# Patient Record
Sex: FEMALE | Race: BLACK OR AFRICAN AMERICAN | Employment: OTHER | URBAN - METROPOLITAN AREA
[De-identification: names, ages, dates, MRNs, and addresses within clinical notes are randomized per-mention and may not be internally consistent; named-entity substitution may affect disease eponyms.]

---

## 2017-06-23 ENCOUNTER — TELEPHONE (OUTPATIENT)
Dept: RHEUMATOLOGY | Age: 67
End: 2017-06-23

## 2017-06-28 ENCOUNTER — OFFICE VISIT (OUTPATIENT)
Dept: RHEUMATOLOGY | Age: 67
End: 2017-06-28

## 2017-06-28 VITALS
TEMPERATURE: 97.6 F | SYSTOLIC BLOOD PRESSURE: 121 MMHG | RESPIRATION RATE: 18 BRPM | DIASTOLIC BLOOD PRESSURE: 76 MMHG | HEIGHT: 63 IN | BODY MASS INDEX: 29.06 KG/M2 | HEART RATE: 64 BPM | OXYGEN SATURATION: 100 % | WEIGHT: 164 LBS

## 2017-06-28 DIAGNOSIS — Z79.60 LONG-TERM USE OF IMMUNOSUPPRESSANT MEDICATION: ICD-10-CM

## 2017-06-28 DIAGNOSIS — Z79.899 LONG-TERM USE OF PLAQUENIL: ICD-10-CM

## 2017-06-28 DIAGNOSIS — M05.79 SEROPOSITIVE RHEUMATOID ARTHRITIS OF MULTIPLE SITES (HCC): Primary | ICD-10-CM

## 2017-06-28 DIAGNOSIS — M19.042 PRIMARY OSTEOARTHRITIS OF BOTH HANDS: ICD-10-CM

## 2017-06-28 DIAGNOSIS — M19.041 PRIMARY OSTEOARTHRITIS OF BOTH HANDS: ICD-10-CM

## 2017-06-28 DIAGNOSIS — M35.00 SECONDARY SJOGREN'S SYNDROME (HCC): ICD-10-CM

## 2017-06-28 DIAGNOSIS — B37.0 ORAL THRUSH: ICD-10-CM

## 2017-06-28 DIAGNOSIS — M17.0 PRIMARY OSTEOARTHRITIS OF BOTH KNEES: ICD-10-CM

## 2017-06-28 RX ORDER — METHOTREXATE 2.5 MG/1
20 TABLET ORAL
COMMUNITY
End: 2018-01-10

## 2017-06-28 RX ORDER — BISMUTH SUBSALICYLATE 262 MG
1 TABLET,CHEWABLE ORAL DAILY
COMMUNITY
End: 2018-07-09

## 2017-06-28 RX ORDER — FLUCONAZOLE 150 MG/1
150 TABLET ORAL DAILY
Qty: 1 TAB | Refills: 0 | Status: SHIPPED | OUTPATIENT
Start: 2017-06-28 | End: 2017-06-29

## 2017-06-28 RX ORDER — HYDROXYCHLOROQUINE SULFATE 200 MG/1
200 TABLET, FILM COATED ORAL 2 TIMES DAILY
COMMUNITY
End: 2017-06-28 | Stop reason: ALTCHOICE

## 2017-06-28 RX ORDER — MELOXICAM 15 MG/1
15 TABLET ORAL DAILY
COMMUNITY
End: 2017-09-28

## 2017-06-28 RX ORDER — FOLIC ACID 1 MG/1
1 TABLET ORAL DAILY
Qty: 90 TAB | Refills: 0 | Status: SHIPPED | OUTPATIENT
Start: 2017-06-28 | End: 2017-09-26

## 2017-06-28 RX ORDER — FOLIC ACID 1 MG/1
TABLET ORAL DAILY
COMMUNITY
End: 2017-06-28 | Stop reason: SDUPTHER

## 2017-06-28 NOTE — PATIENT INSTRUCTIONS
COMPLETE AND STOP RHEUMATE    TAKE FOLIC ACID DAILY    STOP HYDROXYCHLOROQUINE    SJOGREN'S SYNDROME    Sjogren's Syndrome is an autoimmune disease that generally affects the salivary and lacrimal glands. Manifestations generally include xerostomia and xerophthalmia, also known as keratoconjunctivitis sicca, that is not secondary to medications (psychiatric, anti-hypertensive), metabolic disorders (hypothyroidism, diabetes), contact lens wearing, and/or decreased blinking (computer work, reading). It may also involve extraglandular organs. There is up to a 10% risk for development of lymphoma, which is up to 44 times higher than the normal population. The greatest risk factor for lymphoma include persistently enlarged salivary glands. Other risk factors include cutaneous vasculitis, lymphadenopathy, splenomegaly, cryoglobulinemia, and the development of glomerulonephritis. Laboratory abnormalities associated with the increased risk for lymphoma include mixed monoclonal cryoglobulinemia, low serum complement C3 or C4, an IgM kappa monoclonal protein, and neutropenia. Therefore, the need for monitoring these labs serially is necessary. My Recommendations    I recommend at least annual ophthalmic.  I referred you to ophthalmology to test you for dry eyes [facility])     I recommend at least annual dental evaluations.    - Brushing teeth at least twice per day   - Avoid sugary lozenges, beverages (Coke/Pepsi) candies and/or gums and to avoid using alcohol based mouth wash   - Sugar-free products are preferabe   - Biotin oral products are preferable

## 2017-06-28 NOTE — MR AVS SNAPSHOT
Visit Information Date & Time Provider Department Dept. Phone Encounter #  
 6/28/2017 11:00 AM Janet Cabral MD [facility] and [address] [place] [address] [phone] Follow-up Instructions Return in about 3 months (around 9/28/2017). Upcoming Health Maintenance Date Due Hepatitis C Screening 1950 DTaP/Tdap/Td series (1 - Tdap) 10/22/1970 BREAST CANCER SCRN MAMMOGRAM 10/22/1999 FOBT Q 1 YEAR AGE 50-75 10/22/1999 ZOSTER VACCINE AGE 60> 10/22/2009 GLAUCOMA SCREENING Q2Y 10/22/2014 OSTEOPOROSIS SCREENING (DEXA) 10/22/2014 Pneumococcal 65+ Low/Medium Risk (1 of 2 - PCV13) 10/22/2014 MEDICARE YEARLY EXAM 10/22/2014 INFLUENZA AGE 9 TO ADULT 8/1/2017 Allergies as of 6/28/2017  Review Complete On: 6/28/2017 By: Janet Cabral MD  
  
 Severity Noted Reaction Type Reactions Nickel  06/28/2017    Rash Current Immunizations  Never Reviewed No immunizations on file. Not reviewed this visit You Were Diagnosed With   
  
 Codes Comments Seropositive rheumatoid arthritis of multiple sites [facility])    -  Primary ICD-10-CM: M05.79 ICD-9-CM: 714.0 Long-term use of immunosuppressant medication     ICD-10-CM: Z79.899 ICD-9-CM: V58.69 Primary osteoarthritis of both knees     ICD-10-CM: M17.0 ICD-9-CM: 715.16 Primary osteoarthritis of both hands     ICD-10-CM: M19.041, D95.528 ICD-9-CM: 715.14 Secondary Sjogren's syndrome ([facility] [address]     ICD-10-CM: M35.00 ICD-9-CM: 710.2 Oral thrush     ICD-10-CM: B37.0 ICD-9-CM: 112.0 Vitals BP Pulse Temp Resp Height(growth percentile) Weight(growth percentile) 121/76 (BP 1 Location: Right arm, BP Patient Position: Sitting) 64 97.6 °F (36.4 °C) 18 5' 3\" (1.6 m) 164 lb (74.4 kg) SpO2 BMI OB Status Smoking Status 100% 29.05 kg/m2 Postmenopausal Former Smoker BMI and BSA Data Body Mass Index Body Surface Area  29.05 kg/m 2 1.82 m 2  
  
  
 Preferred Pharmacy Pharmacy Name Phone [facility], [address] [place] [address] [phone] Your Updated Medication List  
  
   
This list is accurate as of: 6/28/17 11:07 AM.  Always use your most recent med list.  
  
  
  
  
 fluconazole 150 mg tablet Commonly known as:  DIFLUCAN Take 1 Tab by mouth daily for 1 day. FDA advises cautious prescribing of oral fluconazole in pregnancy. folic acid 1 mg tablet Commonly known as:  Google Take 1 Tab by mouth daily for 90 days. meloxicam 15 mg tablet Commonly known as:  MOBIC Take 15 mg by mouth daily. methotrexate 2.5 mg tablet Commonly known as:  Ora Fairchild Take 20 mg by mouth every Tuesday. multivitamin tablet Commonly known as:  ONE A DAY Take 1 Tab by mouth daily. Prescriptions Sent to Pharmacy Refills  
 fluconazole (DIFLUCAN) 150 mg tablet 0 Sig: Take 1 Tab by mouth daily for 1 day. FDA advises cautious prescribing of oral fluconazole in pregnancy. Class: Normal  
 Pharmacy: [id number] [place], [place] [address] Ph #: [phone] Route: Oral  
 folic acid (FOLVITE) 1 mg tablet 0 Sig: Take 1 Tab by mouth daily for 90 days. Class: Normal  
 Pharmacy: [id number] [place] [address] Ph #: [phone] Route: Oral  
  
We Performed the Following ANTINUCLEAR ANTIBODIES, IFA [id number] CPT(R)] C REACTIVE PROTEIN, QT [26557 CPT(R)]   
 C3+C4+COMPT [[id number] Custom] CBC WITH AUTOMATED DIFF [93936 CPT(R)] CHRONIC HEPATITIS PANEL [[id number] Custom] CRYOGLOBULIN W/ REFLX CHASE [[id number] Custom] [address], IGG [id number] CPT(R)] METABOLIC PANEL, COMPREHENSIVE [36464 CPT(R)] PROT+CREATU (RANDOM) [id number] CPT(R)] PROTEIN ELECTROPHORESIS W/ REFLX CHASE [[id number] Custom] QUANTIFERON TB GOLD [[id number] Custom] REFERRAL TO OPHTHALMOLOGY [REF57 Custom] Comments:  
 Dr. Idalia Goodell for dry eye exam - [phone] RHEUMATOID FACTOR, QL [id number] CPT(R)] SED RATE (ESR) [id number] CPT(R)] SJOGREN'S ABS, SSA AND SSB [[id number] Custom] UA/M W/RFLX CULTURE, ROUTINE [[id number] Custom] URIC ACID [id number] CPT(R)] VITAMIN D, 25 HYDROXY [id number] CPT(R)] Follow-up Instructions Return in about 3 months (around 9/28/2017). To-Do List   
 06/28/2017 Imaging:  XR FOOT LT MIN 3 V   
  
 06/28/2017 Imaging:  XR FOOT RT MIN 3 V   
  
 06/28/2017 Imaging:  XR HAND LT MIN 3 V   
  
 06/28/2017 Imaging:  XR HAND RT MIN 3 V Referral Information Referral ID Referred By Referred To  
  
 [id number] sorin [address] Visits Status Start Date End Date 1 New Request 6/28/17 6/28/18 If your referral has a status of pending review or denied, additional information will be sent to support the outcome of this decision. Patient Instructions COMPLETE AND STOP RHEUMATE 
 
TAKE FOLIC ACID DAILY 
 
STOP HYDROXYCHLOROQUINE 
 
SJOGREN'S SYNDROME Sjogren's Syndrome is an autoimmune disease that generally affects the salivary and lacrimal glands. Manifestations generally include xerostomia and xerophthalmia, also known as keratoconjunctivitis sicca, that is not secondary to medications (psychiatric, anti-hypertensive), metabolic disorders (hypothyroidism, diabetes), contact lens wearing, and/or decreased blinking (computer work, reading). It may also involve extraglandular organs. There is up to a 10% risk for development of lymphoma, which is up to 44 times higher than the normal population. The greatest risk factor for lymphoma include persistently enlarged salivary glands.  Other risk factors include cutaneous vasculitis, lymphadenopathy, splenomegaly, cryoglobulinemia, and the development of glomerulonephritis. Laboratory abnormalities associated with the increased risk for lymphoma include mixed monoclonal cryoglobulinemia, low serum complement C3 or C4, an IgM kappa monoclonal protein, and neutropenia. Therefore, the need for monitoring these labs serially is necessary. My Recommendations I recommend at least annual ophthalmic. I referred you to ophthalmology to test you for dry eyes [facility]) I recommend at least annual dental evaluations.  
 - Brushing teeth at least twice per day - Avoid sugary lozenges, beverages (Coke/Pepsi) candies and/or gums and to avoid using alcohol based mouth wash - Sugar-free products are preferabe - Biotin oral products are preferable Introducing [facility]! [employer] introduces Unype patient portal. Now you can access parts of your medical record, email your doctor's office, and request medication refills online. 1. In your internet browser, go to [email or web address] "[email or web address] 2. Click on the First Time User? Click Here link in the Sign In box. You will see the New Member Sign Up page. 3. Enter your Unype Access Code exactly as it appears below. You will not need to use this code after youve completed the sign-up process. If you do not sign up before the expiration date, you must request a new code. · Unype Access Code: [email or web address] Expires: 9/26/2017 11:04 AM 
 
4. Enter the last four digits of your Social Security Number (xxxx) and Date of Birth (mm/dd/yyyy) as indicated and click Submit. You will be taken to the next sign-up page. 5. Create a iBoxPayt ID. This will be your iBoxPayt login ID and cannot be changed, so think of one that is secure and easy to remember. 6. Create a Unype password. You can change your password at any time. 7. Enter your Password Reset Question and Answer.  This can be used at a later time if you forget your password. 8. Enter your e-mail address. You will receive e-mail notification when new information is available in [address] 9. Click Sign Up. You can now view and download portions of your medical record. 10. Click the Download Summary menu link to download a portable copy of your medical information. If you have questions, please visit the Frequently Asked Questions section of the obopay website. Remember, obopay is NOT to be used for urgent needs. For medical emergencies, dial 911. Now available from your iPhone and Android! Please provide this summary of care documentation to your next provider. If you have any questions after today's visit, please call [phone].

## 2017-06-28 NOTE — PROGRESS NOTES
REASON FOR VISIT    This is the initial evaluation for Ms. Pennie Nazario a 78 y.o.  female for question of an inflammatory arthritis. The patient is self-referred to the Arthritis and [address] [place] [address]. HISTORY OF PRESENT ILLNESS      I have reviewed and summarized old records from [facility] and Dr. Arvind Lopez office notes. In 12/2014, she developed pain that started in her feet, ankles and then spread to her hands wrists, knees, shoulders. There was swelling in is ankles and hands. In 2/015, she was evaluated by Dr. Chris Canales who diagnosed her with Seropositive Rheumatoid Arthritis and started her on prednisone, hydroxychloroquine and methotrexate 20 mg weekly. She was also diagnosed with osteoarthritis and found to have positive anti-Scl-70. In 12/12/2016, labs showed WBC 4.8, lymphocytes 1.8, Hct 38.3%, platelets 127,964, creatinine 0.69 mg/dL, eGFR >60, albumin 4.2 g/dL, ALK 68 U/L, ALT 24 U/L, AST 21 U/L, ESR 9 mm/hr, CRP 0.5 mg/L. In 11/2016, she developed oral ulcers, so started taking folic acid as needed which has helped. She has been taking Rheumate daily, which contains folic acid. In 12/2016, she also developed dry mouth. In 1/2017, she saw optometrist.     In 2/2017, she had dental implants and her dry mouth has worsened. She was found to have oral thrush. She was prescribed Nystatin for two weeks. She remembers being prescribed with an antibiotic. She is a former smoker. She has a family history of Rheumatoid Arthritis in her grandmother. Today, she complains of morning stiffness in her fingers lasting 30 minutes. She denies pain or swelling in her joints. She feels that methotrexate is causing dry mouth and a sore tongue. If it rains, she feels aching in her wrists. She has not had her eyes tested for hydroxychloroquine. She denies xerophthalmia. She does not need water to swallow food. She is a retired teacher and hair.      Therapy History includes:    Current DMARD therapy includes: hydroxychloroquine 400 daily, methotrexate 20 mg every Tuesday  Prior DMARD therapy includes: none  The following DMARDs have been ineffective: none  The following DMARDs were stopped because of side effects: none    REVIEW OF SYSTEMS    A 15 point review of systems was performed and summarized below. The questionnaire was reviewed with the patient and scanned into the patient's medical record.     General: denies recent weight gain, recent weight loss, fatigue, weakness, fever, night sweats  Musculoskeletal: endorses morning stiffness (lasting 30 minutes), denies joint pain, joint swelling, muscle weakness  Ears: denies ringing in ears, loss of hearing, deafness  Eyes: denies pain, redness, loss of vision, double vision, blurred vision, dryness, foreign body sensation  Mouth: endorses sore tongue, oral ulcers, denies bleeding gums, loss of taste, dryness, increased dental caries  Nose: denies nosebleeds, loss of smell, nasal ulcers  Throat: denies frequent sore throats, hoarseness, difficulty in swallowing, pain in jaw while chewing  Neck: denies swollen glands, tender glands  Cardiopulmonary: endorses swollen legs or feet, denies pain in chest, irregular heart beat, sudden changes in heart beat, shortness of breath, difficulty breathing at night, cough, coughing of blood, wheezing  Gastrointestinal: denies nausea, heartburn, stomach pain relieved by food, vomiting of blood/\"coffee grounds\", jaundice, increasing constipation, persistent diarrhea, blood in stools, black stools  Genitourinary: endorses getting up at night to pass urine, frequent urination, vaginal dryness, denies difficult urination, pain or burning on urination, blood in urine, cloudy urine, pus in urine, genital discharge, rash/ulcers, sexual difficulties  Hematologic: denies anemia, bleeding tendency, blood clots  Skin: denies easy bruising, redness, rash, hives, sun sensitive, skin tightness, nodules/bumps, hair loss, color changes of hands or feet in the cold (Raynaud's)  Neurologic: denies headaches, dizziness, fainting of loss of consciousness, numbness or tingling in hands/feet, memory loss, muscle weakness  Psychiatric: denies depression, excessive worries  Sleep: denies poor sleep (7 hours), snoring, daytime somnolence, difficulty falling asleep, difficulty staying asleep     PAST MEDICAL HISTORY    She has a past medical history of Rheumatoid arthritis ([facility] [address] FAMILY HISTORY    Her family history includes Arthritis-rheumatoid in her paternal grandmother; Diabetes in her mother; Heart Attack in her father; Parkinson's Disease in her maternal grandmother. SOCIAL HISTORY    She reports that she has quit smoking. She has never used smokeless tobacco. She reports that she drinks alcohol. She reports that she does not use illicit drugs. GYNECOLOGIC HISTORY     3, Para 3, Living 3, Miscarriage 0    She denies severe pre-eclampsia, eclampsia or placental insufficiency    HEALTH MAINTENANCE    Immunizations    There is no immunization history on file for this patient. Age Appropriate Cancer Screening    Mammogram:     MEDICATIONS    Current Outpatient Prescriptions   Medication Sig Dispense Refill    methotrexate (RHEUMATREX) 2.5 mg tablet Take 20 mg by mouth every Tuesday.  meloxicam (MOBIC) 15 mg tablet Take 15 mg by mouth daily.  multivitamin (ONE A DAY) tablet Take 1 Tab by mouth daily.  fluconazole (DIFLUCAN) 150 mg tablet Take 1 Tab by mouth daily for 1 day. FDA advises cautious prescribing of oral fluconazole in pregnancy. 1 Tab 0    folic acid (FOLVITE) 1 mg tablet Take 1 Tab by mouth daily for 90 days.  90 Tab 0     ALLERGIES    Allergies   Allergen Reactions    Nickel Rash       PHYSICAL EXAMINATION    Visit Vitals    /76 (BP 1 Location: Right arm, BP Patient Position: Sitting)    Pulse 64    Temp 97.6 °F (36.4 °C)    Resp 18    Ht 5' 3\" (1.6 m)  Wt 164 lb (74.4 kg)    SpO2 100%    BMI 29.05 kg/m2     Body mass index is 29.05 kg/(m^2). General: Patient is alert, oriented x 3, not in acute distress    HEENT:   Conjunctiva are not injected and appear moist, oral mucous membranes are moist, there are no ulcers present, there is no alopecia, neck is supple, there is no lymphadenopathy. Left lacrimal gland enlargement. Sight enlargement of parotids. Moist mucous with good salivary pooling. Oral Thrush    Cardiovascular:  Heart is regular rate and rhythm, no murmurs. Chest:  Lungs are clear to auscultation bilaterally. Abdomen:  Soft, non-tender    Extremities:  Free of clubbing, cyanosis, edema, extremities well perfused. Neurological exam:  No focal sensory deficits, muscle strength is full in upper and lower extremities. Skin exam:  There are no rashes, no tophi, no psoriasis, no active Raynaud's, no livedo reticularis, no periungual erythema. Musculoskeletal exam:  A comprehensive musculoskeletal exam was performed for all joints of each upper and lower extremity and assessed for swelling, tenderness and range of motion. Pertinent results are documented as below:    Bilateral Gabe and Heberden nodes. Bilateral knee crepitus without effusion. Joint Count 6/28/2017   Patient pain (0-100) 0   MHAQ 0   Right 3rd PIP - Swollen 1   Swollen Joint Count (Total) 1   Patient Assessment (0-10) 0            DATA REVIEW    Prior medical records were reviewed and are summarized as below:    Laboratory data: summarized in the HPI    Imaging: none     ASSESSMENT AND PLAN    1) Seropositive Rheumatoid Arthritis. She was diagnosed with seropositive Rheumatoid Arthritis by Dr. Arsenio Rea and has been maintained on hydroxychloroquine 400 mg daily and methotrexate 20 mg every Tuesday. She is doing well except for 30 minutes of stiffness in her hands, which appear to be osteoarthritis. Her CDAI was 1 with 0 tender and 1 swollen joint. She has a chronically swollen 3rd right PIP which she reports is from an injury years ago. She wants to wean off methotrexate. I discontinued hydroxychloroquine and informed her that I will reassess her in 3 months and if she has no interval flares or active disease, I will start weaning her off. She has been taking Rheumate instead of folic acid 1 mg daily and has had interval oral ulcers, so I asked her to stop Rheumate and start taking folic acid 1 mg daily. I sent her a refill. I ordered labs and radiographs today. 2) Long Term Use of Immunosuppressants. The patient remains on immunomodulatory medications (methotrexate) and requires frequent toxicity monitoring by blood work. Respective labs were ordered (CBC and CMP). 3) Sjogren's Syndrome. (lacrimal and parotid gland hypertrophy, xerostomia). Sjogren's Syndrome is an autoimmune disease that generally affects the salivary and lacrimal glands. Manifestations generally include xerostomia and xerophthalmia, also known as keratoconjunctivitis sicca, that is not secondary to medications (psychiatric, anti-hypertensive), metabolic disorders (hypothyroidism, diabetes), contact lens wearing, and/or decreased blinking (computer work, reading). It may also involve extraglandular organs. There is up to a 10% risk for development of lymphoma, which is up to 44 times higher than the normal population. The greatest risk factor for lymphoma include persistently enlarged salivary glands. Other risk factors include cutaneous vasculitis, lymphadenopathy, splenomegaly, cryoglobulinemia, and the development of glomerulonephritis. Laboratory abnormalities associated with the increased risk for lymphoma include mixed monoclonal cryoglobulinemia, low serum complement C3 or C4, an IgM kappa monoclonal protein, and neutropenia. Therefore, the need for monitoring these labs serially is necessary. I recommend at least annual ophthalmic and dental evaluations.  Brushing teeth twice per day was suggested. I counseled the patient to avoid sugary lozenges, candies and/or gums and to avoid using alcohol based mouth wash. Sugar-free, fruits, and Biotin oral products are preferable. She has gland enl;argement but no lymphadenopathy, night sweats, or unintentional weight loss. I will check labs and I referred her to ophthalmology, Dr. Shayan Newell. 4) Bilateral Knee Osteoarthritis. The patient has osteoarthritis, which is also known as \"wear and tear arthritis,\" non-inflammatory arthritis or mechanical arthritis. There are hereditary, vocational and posttraumatic joint injuries predisposing factors. I recommend maintaining a healthy weight to slow the progression of osteoarthritis in addition to following the [employer] of Rheumatology Osteoarthritis Treatment Guidelines: (1) non-pharmacologic modalities such as aerobic, aquatic, and/or resistance exercises as well as weight loss for overweight patients; in addition to (2) pharmacologic modalities such as acetaminophen as first line, oral and topical NSAIDs as second line, tramadol as third line and intra-articular corticosteroid injections as fourth line (Hal MC, et al. Arthritis Care Res [facility] ORTHOPEDIC). 2012;64(4):465). Naproxen is a low SANDERS-2 selectivity inhibitor that poses lower cardiovascular risk than other NSAIDs (Angiolillo DJ, Joaquín SM. Clinical Pharmacology and Cardiovascular Safety of Naproxen. Am J Cardiovasc Drugs. 2016 Nov 8). NSAIDs should not be used in patients on blood thinners, chronic kidney disease, high risk coronary artery disease, and inflammatory bowel disease (ulcerative colitis or Crohn's disease) Joint replacement surgery if all previous fail, knowing that there is a 10 year lifespan per prosthesis. I recommend weight loss because every 1 lb of extra weight is approximately 5 lbs of extra weight on the knees. I asked the patient to count their daily caloric intake and try not to exceed 1378-2398 calories.  I asked the patient to perform at least 30 minutes of physical exercise per day, such as walking, climbing stairs, or swimming. If they have access to a pool, I recommend walking in the pool for at least 30 minutes every day. Performing at least 8 weeks of yoga (two 60-minute classes and 1 home practice per week) was shown to help individuals with arthritis safely increase physical activity, and improve physical and psychological health [facility] et al. Read Tor Rheumatol. 2015 Jul;42(7):1194-202). 5) Bilateral Hand Osteoarthritis. The patient has osteoarthritis, which is also known as \"wear and tear arthritis,\" non-inflammatory arthritis or mechanical arthritis. There are hereditary, vocational and posttraumatic joint injuries predisposing factors. I recommend non-pharmacologic modalities such as keeping hands warm, avoid activities that case pain, warm water soaks, in addition to (2) pharmacologic modalities such as acetaminophen as first line, oral and topical NSAIDs as second line. Naproxen is a low SANDERS-2 selectivity inhibitor that poses lower cardiovascular risk than other NSAIDs (Angioliayden DJ, Joaquín SM. Clinical Pharmacology and Cardiovascular Safety of Naproxen. Am J Cardiovasc Drugs. 2016 Nov 8). NSAIDs should not be used in patients on blood thinners, chronic kidney disease, high risk coronary artery disease, and inflammatory bowel disease (ulcerative colitis or Crohn's disease)    6) Oral Thrush. She did not have complete response to Nystatin. I prescribed her fluconazole. 7) Long Term Use of Hydroxychloroquine (Plaquenil). I will discontinue it. The patient voiced understanding of the aforementioned assessment and plan. Summary of plan was provided in the After Visit Summary patient instructions. I also provided education about MyChart setup and utility.     TODAY'S ORDERS    Orders Placed This Encounter    QUANTIFERON TB GOLD    XR FOOT LT MIN 3 V    XR FOOT RT MIN 3 V    XR HAND LT MIN 3 V    XR HAND RT MIN 3 V    CYCLIC CITRUL PEPTIDE AB, IGG    CBC WITH AUTOMATED DIFF    CHRONIC HEPATITIS PANEL    METABOLIC PANEL, COMPREHENSIVE    C REACTIVE PROTEIN, QT    SED RATE (ESR)    RHEUMATOID FACTOR, QL    PROTEIN ELECTROPHORESIS W/ REFLX CHASE    UA/M W/RFLX CULTURE, ROUTINE    URIC ACID    PROT+CREATU (RANDOM)    VITAMIN D, 25 HYDROXY    ANTINUCLEAR ANTIBODIES, IFA    C3+C4+COMPT    CRYOGLOBULIN W/ REFLX CHASE    SJOGREN'S ABS, SSA AND SSB    REFERRAL TO OPHTHALMOLOGY    methotrexate (RHEUMATREX) 2.5 mg tablet    DISCONTD: hydroxychloroquine (PLAQUENIL) 200 mg tablet    fluconazole (DIFLUCAN) 150 mg tablet    folic acid (FOLVITE) 1 mg tablet     Future Appointments  Date Time Provider Alyssa Ha   9/28/2017 10:00 AM MD Aba Fox MD, [facility]    Adult Rheumatology   Musculoskeletal Ultrasound Certified  [address] [place] [address] Ave   [address] [address]   Phone [phone]  Fax [phone]

## 2017-06-29 LAB
C3 SERPL-MCNC: 140 MG/DL (ref 82–167)
C4 SERPL-MCNC: 30 MG/DL (ref 14–44)
CH50 SERPL-ACNC: >60 U/ML (ref 42–60)
COMMENT, 144067: NORMAL
HBV CORE AB SERPL QL IA: NEGATIVE
HBV CORE IGM SERPL QL IA: NEGATIVE
HBV E AB SERPL QL IA: NEGATIVE
HBV E AG SERPL QL IA: NEGATIVE
HBV SURFACE AB SER QL: NON REACTIVE
HBV SURFACE AG SERPL QL IA: NEGATIVE
HCV AB S/CO SERPL IA: <0.1 S/CO RATIO (ref 0–0.9)

## 2017-07-01 LAB
APPEARANCE UR: ABNORMAL
BACTERIA #/AREA URNS HPF: ABNORMAL /[HPF]
BACTERIA UR CULT: NORMAL
BILIRUB UR QL STRIP: NEGATIVE
CASTS URNS QL MICRO: ABNORMAL /LPF
COLOR UR: YELLOW
CREAT UR-MCNC: 38.4 MG/DL
EPI CELLS #/AREA URNS HPF: >10 /HPF
GLUCOSE UR QL: NEGATIVE
HGB UR QL STRIP: NEGATIVE
KETONES UR QL STRIP: NEGATIVE
LEUKOCYTE ESTERASE UR QL STRIP: ABNORMAL
MICRO URNS: ABNORMAL
NITRITE UR QL STRIP: NEGATIVE
PH UR STRIP: 6 [PH] (ref 5–7.5)
PROT UR QL STRIP: NEGATIVE
PROT UR-MCNC: 7.6 MG/DL
PROT/CREAT UR: 198 MG/G CREAT (ref 0–200)
RBC #/AREA URNS HPF: ABNORMAL /HPF
SP GR UR: 1.01 (ref 1–1.03)
URINALYSIS REFLEX, 377202: ABNORMAL
UROBILINOGEN UR STRIP-MCNC: 0.2 MG/DL (ref 0.2–1)
WBC #/AREA URNS HPF: ABNORMAL /HPF

## 2017-07-03 LAB
25(OH)D3+25(OH)D2 SERPL-MCNC: 29.7 NG/ML (ref 30–100)
ALBUMIN SERPL ELPH-MCNC: 4 G/DL (ref 2.9–4.4)
ALBUMIN SERPL-MCNC: 4.6 G/DL (ref 3.6–4.8)
ALBUMIN/GLOB SERPL: 1.6 {RATIO} (ref 0.7–1.7)
ALBUMIN/GLOB SERPL: 2.4 {RATIO} (ref 1.2–2.2)
ALP SERPL-CCNC: 81 IU/L (ref 39–117)
ALPHA1 GLOB SERPL ELPH-MCNC: 0.2 G/DL (ref 0–0.4)
ALPHA2 GLOB SERPL ELPH-MCNC: 0.7 G/DL (ref 0.4–1)
ALT SERPL-CCNC: 30 IU/L (ref 0–32)
ANA TITR SER IF: NEGATIVE {TITER}
AST SERPL-CCNC: 24 IU/L (ref 0–40)
B-GLOBULIN SERPL ELPH-MCNC: 1.2 G/DL (ref 0.7–1.3)
BASOPHILS # BLD AUTO: 0 X10E3/UL (ref 0–0.2)
BASOPHILS NFR BLD AUTO: 0 %
BILIRUB SERPL-MCNC: 0.3 MG/DL (ref 0–1.2)
BUN SERPL-MCNC: 14 MG/DL (ref 8–27)
BUN/CREAT SERPL: 17 (ref 12–28)
CALCIUM SERPL-MCNC: 9.8 MG/DL (ref 8.7–10.3)
CCP IGA+IGG SERPL IA-ACNC: >250 UNITS (ref 0–19)
CHLORIDE SERPL-SCNC: 103 MMOL/L (ref 96–106)
CO2 SERPL-SCNC: 24 MMOL/L (ref 18–29)
CREAT SERPL-MCNC: 0.83 MG/DL (ref 0.57–1)
CRP SERPL-MCNC: 0.7 MG/L (ref 0–4.9)
CRYOGLOB SER QL 1D COLD INC: NORMAL
ENA SS-A AB SER-ACNC: 0.2 AI (ref 0–0.9)
ENA SS-B AB SER-ACNC: <0.2 AI (ref 0–0.9)
EOSINOPHIL # BLD AUTO: 0.2 X10E3/UL (ref 0–0.4)
EOSINOPHIL NFR BLD AUTO: 5 %
ERYTHROCYTE [DISTWIDTH] IN BLOOD BY AUTOMATED COUNT: 15.3 % (ref 12.3–15.4)
ERYTHROCYTE [SEDIMENTATION RATE] IN BLOOD BY WESTERGREN METHOD: 2 MM/HR (ref 0–40)
GAMMA GLOB SERPL ELPH-MCNC: 0.5 G/DL (ref 0.4–1.8)
GLOBULIN SER CALC-MCNC: 1.9 G/DL (ref 1.5–4.5)
GLOBULIN SER CALC-MCNC: 2.5 G/DL (ref 2.2–3.9)
GLUCOSE SERPL-MCNC: 81 MG/DL (ref 65–99)
HCT VFR BLD AUTO: 41.6 % (ref 34–46.6)
HGB BLD-MCNC: 13.2 G/DL (ref 11.1–15.9)
IGA SERPL-MCNC: 131 MG/DL (ref 87–352)
IGG SERPL-MCNC: 485 MG/DL (ref 700–1600)
IGM SERPL-MCNC: 24 MG/DL (ref 26–217)
IMM GRANULOCYTES # BLD: 0 X10E3/UL (ref 0–0.1)
IMM GRANULOCYTES NFR BLD: 0 %
INTERPRETATION SERPL IEP-IMP: ABNORMAL
LYMPHOCYTES # BLD AUTO: 1.4 X10E3/UL (ref 0.7–3.1)
LYMPHOCYTES NFR BLD AUTO: 46 %
M PROTEIN SERPL ELPH-MCNC: 0.2 G/DL
MCH RBC QN AUTO: 28.6 PG (ref 26.6–33)
MCHC RBC AUTO-ENTMCNC: 31.7 G/DL (ref 31.5–35.7)
MCV RBC AUTO: 90 FL (ref 79–97)
MONOCYTES # BLD AUTO: 0.2 X10E3/UL (ref 0.1–0.9)
MONOCYTES NFR BLD AUTO: 7 %
NEUTROPHILS # BLD AUTO: 1.3 X10E3/UL (ref 1.4–7)
NEUTROPHILS NFR BLD AUTO: 42 %
PLATELET # BLD AUTO: 293 X10E3/UL (ref 150–379)
PLEASE NOTE, 011150: ABNORMAL
POTASSIUM SERPL-SCNC: 4.9 MMOL/L (ref 3.5–5.2)
PROT PATTERN SERPL ELPH-IMP: ABNORMAL
PROT SERPL-MCNC: 6.5 G/DL (ref 6–8.5)
RBC # BLD AUTO: 4.62 X10E6/UL (ref 3.77–5.28)
RHEUMATOID FACT SERPL-ACNC: 53.8 IU/ML (ref 0–13.9)
SODIUM SERPL-SCNC: 143 MMOL/L (ref 134–144)
URATE SERPL-MCNC: 5.8 MG/DL (ref 2.5–7.1)
WBC # BLD AUTO: 3 X10E3/UL (ref 3.4–10.8)

## 2017-07-04 LAB
ANNOTATION COMMENT IMP: NORMAL
GAMMA INTERFERON BACKGROUND BLD IA-ACNC: 0.05 IU/ML
M TB IFN-G BLD-IMP: NEGATIVE
M TB IFN-G CD4+ BCKGRND COR BLD-ACNC: <0 IU/ML
M TB IFN-G CD4+ T-CELLS BLD-ACNC: 0.04 IU/ML
MITOGEN IGNF BLD-ACNC: >10 IU/ML
QUANTIFERON INCUBATION: NORMAL
SERVICE CMNT-IMP: NORMAL

## 2017-07-04 NOTE — PROGRESS NOTES
The results were reviewed and a letter was sent. Slightly low WBC, low vitamin D, elevated rheumatoid factor 53.8, Monoclonal gammopathy of undetermined significance with IgG monoclonal protein with lambda light chain. All remaining labs are normal/negative. I will discuss on follow up.

## 2017-07-17 ENCOUNTER — TELEPHONE (OUTPATIENT)
Dept: RHEUMATOLOGY | Age: 67
End: 2017-07-17

## 2017-07-18 ENCOUNTER — TELEPHONE (OUTPATIENT)
Dept: RHEUMATOLOGY | Age: 67
End: 2017-07-18

## 2017-07-18 NOTE — TELEPHONE ENCOUNTER
We can send Zofran for the nausea. Does she want meloxicam? Does she need it?  I can refill if she does

## 2017-07-18 NOTE — TELEPHONE ENCOUNTER
Pt called asking for a refill of her meloxicam.  I asked her who prescribed it in the past and she said that Dr. Maya Adames prescribed it but that she no longer sees him. She was not sure if she was supposed to stay on this or not. Also, she wanted to let Dr. Christina Tinajero know that she has been nauseous on the methotrexate. She stated that she is ok to stay on it but that she just wanted Dr. Debora Luna to be aware as he asked to know what was going on with her medications and if she had any problems with them. She has stopped the plaquenil and the rheumate as directed.   Please advise

## 2017-07-19 ENCOUNTER — TELEPHONE (OUTPATIENT)
Dept: RHEUMATOLOGY | Age: 67
End: 2017-07-19

## 2017-07-20 NOTE — TELEPHONE ENCOUNTER
Spoke with pt and she stated that she thinks she is fine without the meloxicam, but if she finds that she needs it she will call back and we will refill it for her then. She does not want the zofran called in because she said the nausea was not that bad ans she really doesn't want to take it anyway.

## 2017-07-25 ENCOUNTER — TELEPHONE (OUTPATIENT)
Dept: RHEUMATOLOGY | Age: 67
End: 2017-07-25

## 2017-07-25 NOTE — TELEPHONE ENCOUNTER
Spoke with pt who stated that she is having a lot of thrush from the methotrexate. She stated that she has been dealing with it for a long time and is tired of it. I mentioned the nystatin swish and spit and she said she had been on it before but the thrush just came back. She would really like to come off of the methotrexate and be placed on something else to see if this problem will go away. Please advise.

## 2017-07-25 NOTE — TELEPHONE ENCOUNTER
I prescribed her fluconazole last visit. Did she use it? She has been on methotrexate for way before I saw her. We can try leflunomide instead.

## 2017-07-26 ENCOUNTER — TELEPHONE (OUTPATIENT)
Dept: RHEUMATOLOGY | Age: 67
End: 2017-07-26

## 2017-07-26 RX ORDER — LEFLUNOMIDE 20 MG/1
20 TABLET ORAL DAILY
Qty: 90 TAB | Refills: 0 | Status: SHIPPED | OUTPATIENT
Start: 2017-07-26 | End: 2018-01-10

## 2017-07-26 NOTE — TELEPHONE ENCOUNTER
Spoke with pt regarding the thrush that she is having from the methotrexate. I asked her if she had taken the diflucan when it was prescribed, and if it helped and she stated that she did take it, but that she did not have much relief from it. I mentioned to her about changing her medication from methotrexate to leflunomide and she was agreeable to that. I told her that I would send in a prescription for her. She stated an understanding.

## 2017-07-31 ENCOUNTER — TELEPHONE (OUTPATIENT)
Dept: RHEUMATOLOGY | Age: 67
End: 2017-07-31

## 2017-07-31 NOTE — TELEPHONE ENCOUNTER
Please call patient she is requesting  a prescription for mouth wash and treatment for thrush of the mouth.   [phone]

## 2017-08-01 ENCOUNTER — TELEPHONE (OUTPATIENT)
Dept: RHEUMATOLOGY | Age: 67
End: 2017-08-01

## 2017-08-01 RX ORDER — NYSTATIN 100000 [USP'U]/ML
1 SUSPENSION ORAL 4 TIMES DAILY
Qty: 60 ML | Refills: 0 | Status: SHIPPED | COMMUNITY
Start: 2017-08-01 | End: 2017-09-28

## 2017-08-01 NOTE — TELEPHONE ENCOUNTER
Spoke with pt and let her know that we will send in nystatin swish and spit for her. She asked if we could also send in diflucan for her because she has had the nystatin swish and spit before and it helped for a little bit but then the thrush came back?

## 2017-08-02 RX ORDER — FLUCONAZOLE 150 MG/1
150 TABLET ORAL EVERY OTHER DAY
Qty: 3 TAB | Refills: 0 | Status: SHIPPED | OUTPATIENT
Start: 2017-08-02 | End: 2017-08-03

## 2017-08-02 NOTE — TELEPHONE ENCOUNTER
Left message for pt stating that we are sending in nystatin swish and spit and diflucan 3 tabs for her to take every other day. I informed her to call back with any questions. detailed exam

## 2017-09-28 ENCOUNTER — OFFICE VISIT (OUTPATIENT)
Dept: RHEUMATOLOGY | Age: 67
End: 2017-09-28

## 2017-09-28 VITALS
RESPIRATION RATE: 18 BRPM | HEART RATE: 76 BPM | SYSTOLIC BLOOD PRESSURE: 118 MMHG | TEMPERATURE: 97.5 F | WEIGHT: 162 LBS | BODY MASS INDEX: 28.7 KG/M2 | DIASTOLIC BLOOD PRESSURE: 84 MMHG | HEIGHT: 63 IN

## 2017-09-28 DIAGNOSIS — M19.042 PRIMARY OSTEOARTHRITIS OF BOTH HANDS: ICD-10-CM

## 2017-09-28 DIAGNOSIS — M65.30 STENOSING TENOSYNOVITIS OF FINGER: ICD-10-CM

## 2017-09-28 DIAGNOSIS — M19.041 PRIMARY OSTEOARTHRITIS OF BOTH HANDS: ICD-10-CM

## 2017-09-28 DIAGNOSIS — M35.00 SECONDARY SJOGREN'S SYNDROME (HCC): ICD-10-CM

## 2017-09-28 DIAGNOSIS — D47.2 MGUS (MONOCLONAL GAMMOPATHY OF UNKNOWN SIGNIFICANCE): ICD-10-CM

## 2017-09-28 DIAGNOSIS — M05.79 SEROPOSITIVE RHEUMATOID ARTHRITIS OF MULTIPLE SITES (HCC): Primary | ICD-10-CM

## 2017-09-28 DIAGNOSIS — Z79.60 LONG-TERM USE OF IMMUNOSUPPRESSANT MEDICATION: ICD-10-CM

## 2017-09-28 DIAGNOSIS — M17.0 PRIMARY OSTEOARTHRITIS OF BOTH KNEES: ICD-10-CM

## 2017-09-28 DIAGNOSIS — E55.9 VITAMIN D DEFICIENCY: ICD-10-CM

## 2017-09-28 RX ORDER — ERGOCALCIFEROL 1.25 MG/1
50000 CAPSULE ORAL
Qty: 12 CAP | Refills: 3 | Status: SHIPPED | OUTPATIENT
Start: 2017-09-28 | End: 2018-07-09 | Stop reason: SDUPTHER

## 2017-09-28 NOTE — PROGRESS NOTES
REASON FOR VISIT    This is a follow-up visit for Ms. Lenny Alberts for Seropositive Rheumatoid Arthritis. Inflammatory arthritis phenotype includes:  Anti-CCP positive: yes (>250)  Rheumatoid factor positive: yes (53.8)  Erosive disease: no  Extra-articular manifestations include: Sjogren's Syndrome    Immunosuppression Screening (6/28/2017): Quantiferon TB: negative  PPD:  Not performed  Hepatitis B: negative  Hepatitis C: negative    Therapy History includes:  Current DMARD therapy includes: leflunomide 20 mg daily  Prior DMARD therapy includes: hydroxychloroquine 400 daily, methotrexate 20 mg every Tuesday  The following DMARDs have been ineffective: none  The following DMARDs were stopped because of side effects: methotrexate (nausea)    Immunizations: There is no immunization history on file for this patient. Active problems include:    Patient Active Problem List   Diagnosis Code    Seropositive rheumatoid arthritis of multiple sites ([facility] M05.79    Long-term use of immunosuppressant medication Z79.899    Primary osteoarthritis of both knees M17.0    Primary osteoarthritis of both hands M19.041, M19.042    Secondary Sjogren's syndrome (HCC) M35.00    Vitamin D deficiency E55.9    MGUS (monoclonal gammopathy of unknown significance) D47.2       HISTORY OF PRESENT ILLNESS    Ms. Lenny Alberts returns for a follow-up. On her last visit, I discontinued hydroxychloroquine, continue methotrexate 20 mg every Tuesday and prescribed Nystatin for oral thrush. Nystatin did not help totally, so fluconazole was prescribed. She also complained of nausea from methotrexate but declined Zofran. methotrexate was then changed to leflunomide in 7/2017. I also referred her to ophthalmology, and saw Tesfaye Munguia OD. Today, she was okay. This past weekend, she had flare in her wrists and hands after being very active associated pain. She has since been doing a cleanse. She also has been drinking herbs (hermelindo, tumeric). Today, she denies wrist pain but has left 2nd finger pain and tightness that lasts 30 minutes. Ms. Jose Perdomo has continued her medications for arthritis and reports good tolerance without significant side effects. Last toxicity monitoring by blood work was done on 6/28/2017 and did not reveal any significant adverse effects, except WBC 3.0, ANC 1.3 (1.4-7.0). Vitamin D 29.7. SPEP/CHASE showed Immunofixation shows IgG monoclonal protein with lambda light chain specificity with M-spike 0.2. Most recent inflammatory markers from 6/28/2017 revealed a ESR 2 mm/hr (previously 9 mm/hr) and CRP 0.7 mg/L (previously 0.5 mg/L). The patient has not had any interval hospital admissions, infections, or surgeries. REVIEW OF SYSTEMS    A comprehensive review of systems was performed and pertinent results are documented in the HPI, review of systems is otherwise non-contributory. PAST MEDICAL HISTORY    She has a past medical history of Rheumatoid arthritis ([address] FAMILY HISTORY    Her family history includes Arthritis-rheumatoid in her paternal grandmother; Diabetes in her mother; Heart Attack in her father; Parkinson's Disease in her maternal grandmother. SOCIAL HISTORY    She reports that she has quit smoking. She has never used smokeless tobacco. She reports that she drinks alcohol. She reports that she does not use illicit drugs. MEDICATIONS    Current Outpatient Prescriptions   Medication Sig Dispense Refill    ergocalciferol (ERGOCALCIFEROL) 50,000 unit capsule Take 1 Cap by mouth every seven (7) days. 12 Cap 3    leflunomide (ARAVA) 20 mg tablet Take 1 Tab by mouth daily. Take half a tab daily for 7 days then increase to 1 tab daily if tolerated. 90 Tab 0    multivitamin (ONE A DAY) tablet Take 1 Tab by mouth daily.  methotrexate (RHEUMATREX) 2.5 mg tablet Take 20 mg by mouth every Tuesday.           ALLERGIES    Allergies   Allergen Reactions    Nickel Rash       PHYSICAL EXAMINATION    Visit Vitals    /84 (BP 1 Location: Left arm, BP Patient Position: Sitting)    Pulse 76    Temp 97.5 °F (36.4 °C)    Resp 18    Ht 5' 3\" (1.6 m)    Wt 162 lb (73.5 kg)    BMI 28.7 kg/m2     Body mass index is 28.7 kg/(m^2). General: Patient is alert, oriented x 3, not in acute distress    HEENT:   Sclerae are not injected and appear moist.  Oral mucous membranes are moist, there are no ulcers present. Left lacrimal gland enlargement. Sight enlargement of parotids. Moist mucous with good salivary pooling. Oral Thrush (RESOLVED)  There is no alopecia. Neck is supple. Cardiovascular:  Heart is regular rate and rhythm, no murmurs. Chest:  Lungs are clear to auscultation bilaterally. No rhonchi, wheezes, or crackles. Extremities:  Free of clubbing, cyanosis, edema    Neurological exam:  No focal sensory deficits, muscle strength is full in upper and lower extremities     Skin exam:  There are no rashes, no alopecia, no discoid lesions, no active Raynaud's, no livedo reticularis, no periungual erythema. Musculoskeletal exam:  A comprehensive musculoskeletal exam was performed for all joints of each upper and lower extremity and assessed for swelling, tenderness and range of motion. Positive results are documented as below:    Bilateral Gabe and Heberden nodes. Bilateral knee crepitus without effusion.   Left 2nd A1 pulley crepitus and tenderness    Joint Count 9/28/2017 6/28/2017   Patient pain (0-100) 20 0   MHAQ 0 0   Left 3rd MCP - Swollen 1 -   Left 5th MCP - Swollen 1 -   Left 2nd PIP - Tender 1 -   Left 2nd PIP - Swollen 1 -   Right 2nd MCP - Swollen 1 -   Right 3rd MCP - Swollen 1 -   Right 3rd PIP - Tender - 0   Right 3rd PIP - Swollen 1 1   Tender Joint Count (Total) 1 0   Swollen Joint Count (Total) 6 1   Physician Assessment (0-10) - 0   Patient Assessment (0-10) 2 0   CDAI Total (calculated) - 1       DATA REVIEW    Laboratory     The following laboratory results were reviewed and discussed with the patient:    Office Visit on 06/28/2017   Component Date Value    CCP Antibodies IgG/IgA 06/28/2017 >250*    WBC 06/28/2017 3.0*    RBC 06/28/2017 4.62     HGB 06/28/2017 13.2     HCT 06/28/2017 41.6     MCV 06/28/2017 90     MCH 06/28/2017 28.6     MCHC 06/28/2017 31.7     RDW 06/28/2017 15.3     PLATELET 16/69/0599 907     NEUTROPHILS 06/28/2017 42     Lymphocytes 06/28/2017 46     MONOCYTES 06/28/2017 7     EOSINOPHILS 06/28/2017 5     BASOPHILS 06/28/2017 0     ABS. NEUTROPHILS 06/28/2017 1.3*    Abs Lymphocytes 06/28/2017 1.4     ABS. MONOCYTES 06/28/2017 0.2     ABS. EOSINOPHILS 06/28/2017 0.2     ABS. BASOPHILS 06/28/2017 0.0     IMMATURE GRANULOCYTES 06/28/2017 0     ABS. IMM. GRANS. 06/28/2017 0.0     Hep B surface Ag screen 06/28/2017 Negative     Hepatitis Be Antigen 06/28/2017 Negative     Hep B Core Ab, IgM 06/28/2017 Negative     Hep B Core Ab, total 06/28/2017 Negative     Hepatitis Be Antibody 06/28/2017 Negative     HEP B SURFACE AB, QUAL 06/28/2017 Non Reactive     HCV Ab 06/28/2017 <0.1     Glucose 06/28/2017 81     BUN 06/28/2017 14     Creatinine 06/28/2017 0.83     GFR est non-AA 06/28/2017 73     GFR est AA 06/28/2017 84     BUN/Creatinine ratio 06/28/2017 17     Sodium 06/28/2017 143     Potassium 06/28/2017 4.9     Chloride 06/28/2017 103     CO2 06/28/2017 24     Calcium 06/28/2017 9.8     Protein, total 06/28/2017 6.5     Albumin 06/28/2017 4.6     GLOBULIN, TOTAL 06/28/2017 1.9     A-G Ratio 06/28/2017 2.4*    Bilirubin, total 06/28/2017 0.3     Alk. phosphatase 06/28/2017 81     AST (SGOT) 06/28/2017 24     ALT (SGPT) 06/28/2017 30     C-Reactive Protein, Qt 06/28/2017 0.7     Sed rate (ESR) 06/28/2017 2     QuantiFERON Incubation 06/28/2017                      Value:Incubated, specimen forwarded to [facility], [place] for  completion of the assay.       Rheumatoid factor 06/28/2017 53.8*  Albumin 06/28/2017 4.0     Alpha-1-globulin 06/28/2017 0.2     ALPHA-2 GLOBULIN 06/28/2017 0.7     Beta globulin 06/28/2017 1.2     Gamma globulin 06/28/2017 0.5     M-spike 06/28/2017 0.2*    Globulin, total 06/28/2017 2.5     A/G ratio 06/28/2017 1.6     Please note 06/28/2017 Comment     Interpretation (see belo* 06/28/2017 .  Specific Gravity 06/28/2017 1.008     pH (UA) 06/28/2017 6.0     Color 06/28/2017 Yellow     Appearance 06/28/2017 Cloudy*    Leukocyte Esterase 06/28/2017 2+*    Protein 06/28/2017 Negative     Glucose 06/28/2017 Negative     Ketone 06/28/2017 Negative     Blood 06/28/2017 Negative     Bilirubin 06/28/2017 Negative     Urobilinogen 06/28/2017 0.2     Nitrites 06/28/2017 Negative     Microscopic Examination 06/28/2017 See additional order     URINALYSIS REFLEX 06/28/2017 Comment     Uric acid 06/28/2017 5.8     Creatinine, urine 06/28/2017 38.4     Protein total, urine 06/28/2017 7.6     Protein/Creat Ratio 06/28/2017 198     VITAMIN D, 25-HYDROXY 06/28/2017 29.7*    Antinuclear Abs, IFA 06/28/2017 Negative     C3 Complement 06/28/2017 140     C4 Complement 06/28/2017 30     Complement, Total (CH50) 06/28/2017 >60*    Cryoglobulin, QL 06/28/2017 Comment     Sjogren's Anti-SS-A 06/28/2017 0.2     Sjogren's Anti-SS-B 06/28/2017 <0.2     Comment 06/28/2017 Comment     WBC 06/28/2017 0-5     RBC 06/28/2017 0-2     Epithelial cells 06/28/2017 >10*    Casts 06/28/2017 None seen     Bacteria 06/28/2017 Few     Urine Culture, Routine 06/28/2017                      Value:Culture shows less than 10,000 colony forming units of bacteria per  milliliter of urine. This colony count is not generally considered  to be clinically significant.       Immunoglobulin G, Qt. 06/28/2017 485*    Immunoglobulin A, Qt. 06/28/2017 131     Immunoglobulin M, Qt. 06/28/2017 24*    Immunofixation Result 06/28/2017 Comment     QuantiFERON TB Gold 06/28/2017 Negative  QUANTIFERON CRITERIA 06/28/2017 Comment     QuantiFERON TB Ag Value 06/28/2017 0.04     QuantiFERON Nil Value 06/28/2017 0.05     QuantiFERON Mitogen Value 06/28/2017 >10.00     QFT TB Ag minus Nil Value 06/28/2017 <0.00     Interpretation: 06/28/2017 Comment        Imaging    Musculoskeletal Ultrasound    None    Radiographs    Bilateral Hand 6/28/2017: RIGHT: No fracture or dislocation on plain film. There are scattered degenerative changes of the interphalangeal joints. No joint space erosion or periosteal reaction. Alignment is within normal limits. Bone mineralization is within normal limits. No soft tissue calcification. LEFT: No fracture or dislocation on plain film. There are scattered degenerative changes of the interphalangeal joints. No joint space erosion or periosteal reaction. Alignment is within normal limits. Bone mineralization is within normal limits. No soft tissue calcification. Bilateral Foot 6/28/2017: RIGHT: No fracture or dislocation on plain film. No joint space narrowing. No joint space erosion or periosteal reaction. Alignment is within normal limits. Bone mineralization is within normal limits. There is minimal calcification at the Achilles insertion. LEFT: No fracture or dislocation on plain film. There are mild degenerative changes first MTP joint and scattered mild degenerative changes of the midfoot. No joint space erosion or periosteal reaction. Alignment is within normal limits. Bone mineralization is within normal limits. No soft tissue  calcification. CT Imaging    None    MR Imaging    None    DXA     None    ASSESSMENT AND PLAN    This is a follow-up visit for Ms. Dl Reyes. 1) Seropositive Rheumatoid Arthritis. She is on leflunomide 20 mg monotherapy. methotrexate was stopped due to nausea. She reports feeling well but had a flare this past weekend which she attributes due to excess drinking. Her CDAI was 11 (previously 1) with 1 tender and 6 swollen joints.  I discussed augmenting therapy to a biologic or to continue monotherapy and reassess in 3 months. She preferred her latter. 2) Long Term Use of Immunosuppressants. The patient remains on immunomodulatory medications (leflunomide) and requires frequent toxicity monitoring by blood work. Respective labs were ordered (CBC and CMP). 3) Sjogren's Syndrome. (lacrimal and parotid gland hypertrophy, xerostomia, monoclonal gammopathy). She saw optometry. I referred her to hematology, Dr. Trina Powers, for MGUS. 4) Bilateral Knee Osteoarthritis. This was not an active issue today. 5) Bilateral Hand Osteoarthritis. This was not an active issue today. 6) Oral Thrush. She did not have complete response to Nystatin. I prescribed her fluconazole. This was resolved. 7) Left 2nd Stenosing Tenosynovitis. This is likely from #1. I offered to inject but she declined. 8) MGUS. This is due to #1, 2. I referred her to Dr. Trina Powers, hematology. 9) Vitamin D Deficiency. Her vitamin D level was 29.7. I prescribed weekly ergocalciferol 50,000. The patient voiced understanding of the aforementioned assessment and plan. Summary of plan was provided in the After Visit Summary patient instructions.      TODAY'S ORDERS    Orders Placed This Encounter    CBC WITH AUTOMATED DIFF    METABOLIC PANEL, COMPREHENSIVE    C REACTIVE PROTEIN, QT    SED RATE (ESR)    REFERRAL TO HEMATOLOGY    ergocalciferol (ERGOCALCIFEROL) 50,000 unit capsule       Future Appointments  Date Time Provider Alyssa Ha   12/28/2017 9:40 AM MD Ryan Zuluaga MD, [address] [place] [address]    Adult Rheumatology   Musculoskeletal Ultrasound Certified  [address] [place] [address]   Phone [phone]  Fax [phone]

## 2017-09-28 NOTE — MR AVS SNAPSHOT
Visit Information Date & Time Provider Department Dept. Phone Encounter #  
 9/28/2017 10:00 AM Rebecca Hernandez, [address] [facility] [address] [place] [id number] Follow-up Instructions Return in about 3 months (around 12/28/2017). Upcoming Health Maintenance Date Due DTaP/Tdap/Td series (1 - Tdap) 10/22/1970 BREAST CANCER SCRN MAMMOGRAM 10/22/1999 FOBT Q 1 YEAR AGE 50-75 10/22/1999 ZOSTER VACCINE AGE 60> 8/22/2009 OSTEOPOROSIS SCREENING (DEXA) 10/22/2014 Pneumococcal 65+ Low/Medium Risk (1 of 2 - PCV13) 10/22/2014 MEDICARE YEARLY EXAM 10/22/2014 INFLUENZA AGE 9 TO ADULT 8/1/2017 GLAUCOMA SCREENING Q2Y 7/27/2019 Allergies as of 9/28/2017  Review Complete On: 9/28/2017 By: Rebecca Hernandez MD  
  
 Severity Noted Reaction Type Reactions Nickel  06/28/2017    Rash Current Immunizations  Never Reviewed No immunizations on file. Not reviewed this visit You Were Diagnosed With   
  
 Codes Comments Seropositive rheumatoid arthritis of multiple sites [facility])    -  Primary ICD-10-CM: M05.79 ICD-9-CM: 714.0 Long-term use of immunosuppressant medication     ICD-10-CM: Z79.899 ICD-9-CM: V58.69 Secondary Sjogren's syndrome ([facility]     ICD-10-CM: M35.00 ICD-9-CM: 710.2 Primary osteoarthritis of both hands     ICD-10-CM: M19.041, X33.301 ICD-9-CM: 715.14 Primary osteoarthritis of both knees     ICD-10-CM: M17.0 ICD-9-CM: 715.16 Vitamin D deficiency     ICD-10-CM: E55.9 ICD-9-CM: 268.9 MGUS (monoclonal gammopathy of unknown significance)     ICD-10-CM: J26.5 ICD-9-CM: 273.1 Vitals BP Pulse Temp Resp Height(growth percentile) Weight(growth percentile) 118/84 (BP 1 Location: Left arm, BP Patient Position: Sitting) 76 97.5 °F (36.4 °C) 18 5' 3\" (1.6 m) 162 lb (73.5 kg) BMI OB Status Smoking Status 28.7 kg/m2 Postmenopausal Former Smoker BMI and BSA Data Body Mass Index Body Surface Area 28.7 kg/m 2 1.81 m 2 Preferred Pharmacy Pharmacy Name Phone [facility] [address] [facility], [address] [place] [address] [phone] Your Updated Medication List  
  
   
This list is accurate as of: 9/28/17 10:32 AM.  Always use your most recent med list.  
  
  
  
  
 ergocalciferol 50,000 unit capsule Commonly known as:  ERGOCALCIFEROL Take 1 Cap by mouth every seven (7) days. leflunomide 20 mg tablet Commonly known as:  [place] Take 1 Tab by mouth daily. Take half a tab daily for 7 days then increase to 1 tab daily if tolerated. methotrexate 2.5 mg tablet Commonly known as:  [place] Eric Take 20 mg by mouth every Tuesday. multivitamin tablet Commonly known as:  ONE A DAY Take 1 Tab by mouth daily. Prescriptions Sent to Pharmacy Refills  
 ergocalciferol (ERGOCALCIFEROL) 50,000 unit capsule 3 Sig: Take 1 Cap by mouth every seven (7) days. Class: Normal  
 Pharmacy: [address] [facility] Ph #: [phone] Route: Oral  
  
We Performed the Following C REACTIVE PROTEIN, QT [17579 CPT(R)] CBC WITH AUTOMATED DIFF [54441 CPT(R)] METABOLIC PANEL, COMPREHENSIVE [23815 CPT(R)] REFERRAL TO HEMATOLOGY [[id number] Custom] Comments: MGUS  
 SED RATE (ESR) [id number] CPT(R)] Follow-up Instructions Return in about 3 months (around 12/28/2017). Referral Information Referral ID Referred By Referred To  
  
 [id number] [address] MD   
   [address] [place] [address] [address] [place] [address] Phone: [phone] Fax: [phone] Visits Status Start Date End Date 1 New Request 9/28/17 9/28/18 If your referral has a status of pending review or denied, additional information will be sent to support the outcome of this decision. Introducing [facility] & HEALTH SERVICES! [facility] introduces [employer] patient portal. Now you can access parts of your medical record, email your doctor's office, and request medication refills online. 1. In your internet browser, go to [email or web address] 2. Click on the First Time User? Click Here link in the Sign In box. You will see the New Member Sign Up page. 3. Enter your [employer] Access Code exactly as it appears below. You will not need to use this code after youve completed the sign-up process. If you do not sign up before the expiration date, you must request a new code. · [employer] Access Code: [email or web address] Expires: 12/27/2017 10:19 AM 
 
4. Enter the last four digits of your Social Security Number (xxxx) and Date of Birth (mm/dd/yyyy) as indicated and click Submit. You will be taken to the next sign-up page. 5. Create a [employer] ID. This will be your [employer] login ID and cannot be changed, so think of one that is secure and easy to remember. 6. Create a [employer] password. You can change your password at any time. 7. Enter your Password Reset Question and Answer. This can be used at a later time if you forget your password. 8. Enter your e-mail address. You will receive e-mail notification when new information is available in [address] 9. Click Sign Up. You can now view and download portions of your medical record. 10. Click the Download Summary menu link to download a portable copy of your medical information. If you have questions, please visit the Frequently Asked Questions section of the [employer] website. Remember, [employer] is NOT to be used for urgent needs. For medical emergencies, dial 911. Now available from your iPhone and Android! Please provide this summary of care documentation to your next provider. If you have any questions after today's visit, please call [phone].

## 2017-09-29 LAB
ALBUMIN SERPL-MCNC: 4.5 G/DL (ref 3.6–4.8)
ALBUMIN/GLOB SERPL: 2.4 {RATIO} (ref 1.2–2.2)
ALP SERPL-CCNC: 92 IU/L (ref 39–117)
ALT SERPL-CCNC: 20 IU/L (ref 0–32)
AST SERPL-CCNC: 20 IU/L (ref 0–40)
BASOPHILS # BLD AUTO: 0 X10E3/UL (ref 0–0.2)
BASOPHILS NFR BLD AUTO: 0 %
BILIRUB SERPL-MCNC: 0.3 MG/DL (ref 0–1.2)
BUN SERPL-MCNC: 11 MG/DL (ref 8–27)
BUN/CREAT SERPL: 13 (ref 12–28)
CALCIUM SERPL-MCNC: 9.4 MG/DL (ref 8.7–10.3)
CHLORIDE SERPL-SCNC: 106 MMOL/L (ref 96–106)
CO2 SERPL-SCNC: 22 MMOL/L (ref 18–29)
CREAT SERPL-MCNC: 0.84 MG/DL (ref 0.57–1)
CRP SERPL-MCNC: 1.7 MG/L (ref 0–4.9)
EOSINOPHIL # BLD AUTO: 0.2 X10E3/UL (ref 0–0.4)
EOSINOPHIL NFR BLD AUTO: 5 %
ERYTHROCYTE [DISTWIDTH] IN BLOOD BY AUTOMATED COUNT: 14.8 % (ref 12.3–15.4)
ERYTHROCYTE [SEDIMENTATION RATE] IN BLOOD BY WESTERGREN METHOD: 14 MM/HR (ref 0–40)
GLOBULIN SER CALC-MCNC: 1.9 G/DL (ref 1.5–4.5)
GLUCOSE SERPL-MCNC: 98 MG/DL (ref 65–99)
HCT VFR BLD AUTO: 37.5 % (ref 34–46.6)
HGB BLD-MCNC: 12.9 G/DL (ref 11.1–15.9)
IMM GRANULOCYTES # BLD: 0 X10E3/UL (ref 0–0.1)
IMM GRANULOCYTES NFR BLD: 0 %
LYMPHOCYTES # BLD AUTO: 1.1 X10E3/UL (ref 0.7–3.1)
LYMPHOCYTES NFR BLD AUTO: 37 %
MCH RBC QN AUTO: 29.2 PG (ref 26.6–33)
MCHC RBC AUTO-ENTMCNC: 34.4 G/DL (ref 31.5–35.7)
MCV RBC AUTO: 85 FL (ref 79–97)
MONOCYTES # BLD AUTO: 0.3 X10E3/UL (ref 0.1–0.9)
MONOCYTES NFR BLD AUTO: 11 %
NEUTROPHILS # BLD AUTO: 1.4 X10E3/UL (ref 1.4–7)
NEUTROPHILS NFR BLD AUTO: 47 %
PLATELET # BLD AUTO: 287 X10E3/UL (ref 150–379)
POTASSIUM SERPL-SCNC: 4.5 MMOL/L (ref 3.5–5.2)
PROT SERPL-MCNC: 6.4 G/DL (ref 6–8.5)
RBC # BLD AUTO: 4.42 X10E6/UL (ref 3.77–5.28)
SODIUM SERPL-SCNC: 144 MMOL/L (ref 134–144)
WBC # BLD AUTO: 3.1 X10E3/UL (ref 3.4–10.8)

## 2017-09-29 NOTE — PROGRESS NOTES
The results were reviewed and a letter was sent. Persistently mildly low white blood counts. Will continue to monitor.  Remaining labs are normal.

## 2017-10-11 ENCOUNTER — TELEPHONE (OUTPATIENT)
Dept: ONCOLOGY | Age: 67
End: 2017-10-11

## 2017-10-11 NOTE — TELEPHONE ENCOUNTER
Left voicemail requesting a call back. Called patient to set up new patient appointment with Dr. Gabino Ordonez. Patient is being referred by Luly Cherry for MGUS.

## 2017-10-19 ENCOUNTER — OFFICE VISIT (OUTPATIENT)
Dept: ONCOLOGY | Age: 67
End: 2017-10-19

## 2017-10-19 VITALS
RESPIRATION RATE: 20 BRPM | TEMPERATURE: 98.3 F | WEIGHT: 162.2 LBS | HEIGHT: 63 IN | OXYGEN SATURATION: 99 % | SYSTOLIC BLOOD PRESSURE: 115 MMHG | HEART RATE: 87 BPM | DIASTOLIC BLOOD PRESSURE: 83 MMHG | BODY MASS INDEX: 28.74 KG/M2

## 2017-10-19 DIAGNOSIS — D47.2 MGUS (MONOCLONAL GAMMOPATHY OF UNKNOWN SIGNIFICANCE): ICD-10-CM

## 2017-10-19 DIAGNOSIS — M05.79 SEROPOSITIVE RHEUMATOID ARTHRITIS OF MULTIPLE SITES (HCC): ICD-10-CM

## 2017-10-19 DIAGNOSIS — D72.819 LEUKOPENIA, UNSPECIFIED TYPE: ICD-10-CM

## 2017-10-19 DIAGNOSIS — M35.00 SECONDARY SJOGREN'S SYNDROME (HCC): ICD-10-CM

## 2017-10-19 DIAGNOSIS — D47.2 MGUS (MONOCLONAL GAMMOPATHY OF UNKNOWN SIGNIFICANCE): Primary | ICD-10-CM

## 2017-10-19 RX ORDER — FLUCONAZOLE 150 MG/1
TABLET ORAL
COMMUNITY
Start: 2017-08-04 | End: 2018-01-10

## 2017-10-19 RX ORDER — NYSTATIN 100000 [USP'U]/ML
SUSPENSION ORAL
COMMUNITY
Start: 2017-08-02 | End: 2018-01-10

## 2017-10-19 RX ORDER — FOLIC ACID 1 MG/1
TABLET ORAL DAILY
COMMUNITY
End: 2018-07-09 | Stop reason: ALTCHOICE

## 2017-10-19 RX ORDER — LANOLIN ALCOHOL/MO/W.PET/CERES
1000 CREAM (GRAM) TOPICAL DAILY
COMMUNITY
End: 2018-01-10

## 2017-10-19 NOTE — PROGRESS NOTES
Hematology/Oncology Consult    REASON FOR CONSULT: Paraproteinemia  REQUESTED BY: Dr. Lexa Howell: Ms. French Barnes is a 78 y.o. female with seropositive RA presents for evaluation of paraproteinemia    She is currently on Leflunomide. She has been off Methotrexate since Aug 2017. She was noted to have 0.2 g/L of  IgG monoclonal protein with lambda light chain Specificity. No evidence of anemia or renal failure. No fevers, chills, CP, SOB, HA, vision changes, no Falls, rashes, recent diarrhea, dysuria. Has chronic sinusitis. No new aches or pains. No changes in weight or appetite. Younger brother has downs syndrome and diagnosed with NHL. No other relevant FH. She smoked 42 years ago. No ETOH . She has not been getting mammograms    Past Medical History:   Diagnosis Date    Rheumatoid arthritis ([facility] [address]        Past Surgical History:   Procedure Laterality Date    HX BREAST BIOPSY      HX  SECTION         Allergies   Allergen Reactions    Nickel Rash       Current Outpatient Prescriptions   Medication Sig Dispense Refill    fluconazole (DIFLUCAN) 150 mg tablet       nystatin (MYCOSTATIN) 100,000 unit/mL suspension       ergocalciferol (ERGOCALCIFEROL) 50,000 unit capsule Take 1 Cap by mouth every seven (7) days. 12 Cap 3    leflunomide (ARAVA) 20 mg tablet Take 1 Tab by mouth daily. Take half a tab daily for 7 days then increase to 1 tab daily if tolerated. 90 Tab 0    methotrexate (RHEUMATREX) 2.5 mg tablet Take 20 mg by mouth every Tuesday.  multivitamin (ONE A DAY) tablet Take 1 Tab by mouth daily.          Social History     Social History    Marital status: SINGLE     Spouse name: N/A    Number of children: N/A    Years of education: N/A     Social History Main Topics    Smoking status: Former Smoker    Smokeless tobacco: Never Used    Alcohol use Yes      Comment: 2 drinks/wk    Drug use: No    Sexual activity: Not on file     Other Topics Concern    Not on file     Social History Narrative       Family History   Problem Relation Age of Onset    Diabetes Mother     Heart Attack Father     Parkinson's Disease Maternal Grandmother     Arthritis-rheumatoid Paternal Grandmother        ROS  A 12 point review of systems was obtained and is negative except as listed in HPI. ECOG PS is 0    Physical Examination:   Visit Vitals    Ht 5' 3\" (1.6 m)     General appearance - alert, well appearing, and in no distress  Mental status - oriented to person, place, and time  Mouth - mucous membranes moist, pharynx normal without lesions  Neck - supple, no significant adenopathy  Lymphatics - no palpable lymphadenopathy, no hepatosplenomegaly  Chest - clear to auscultation, no wheezes, rales or rhonchi, symmetric air entry  Heart - normal rate, regular rhythm, normal S1, S2, no murmurs, rubs, clicks or gallops  Abdomen - soft, nontender, nondistended, no masses or organomegaly, bowel sounds present  Back exam - full range of motion, no tenderness, palpable spasm or pain on motion  Neurological - normal speech, no focal findings or movement disorder noted  Musculoskeletal - no joint tenderness, deformity or swelling  Extremities - peripheral pulses normal, no pedal edema, no clubbing or cyanosis  Skin - normal coloration and turgor, no rashes, no suspicious skin lesions noted    LABS  Lab Results   Component Value Date/Time    WBC 3.1 09/28/2017 11:19 AM    HGB 12.9 09/28/2017 11:19 AM    HCT 37.5 09/28/2017 11:19 AM    PLATELET 120 63/45/0520 11:19 AM    MCV 85 09/28/2017 11:19 AM    ABS.  NEUTROPHILS 1.4 09/28/2017 11:19 AM     Lab Results   Component Value Date/Time    Sodium 144 09/28/2017 11:19 AM    Potassium 4.5 09/28/2017 11:19 AM    Chloride 106 09/28/2017 11:19 AM    CO2 22 09/28/2017 11:19 AM    Glucose 98 09/28/2017 11:19 AM    BUN 11 09/28/2017 11:19 AM    Creatinine 0.84 09/28/2017 11:19 AM    GFR est AA 83 09/28/2017 11:19 AM    GFR est non-AA 72 09/28/2017 11:19 AM Calcium 9.4 09/28/2017 11:19 AM     Lab Results   Component Value Date/Time    AST (SGOT) 20 09/28/2017 11:19 AM    Alk. phosphatase 92 09/28/2017 11:19 AM    Protein, total 6.4 09/28/2017 11:19 AM    Albumin 4.5 09/28/2017 11:19 AM    A-G Ratio 2.4 09/28/2017 11:19 AM     Lab Results   Component Value Date/Time    Sed rate (ESR) 14 09/28/2017 11:19 AM    C-Reactive Protein, Qt 1.7 09/28/2017 11:19 AM    M-spike 0.2 06/28/2017 11:23 AM         ASSESSMENT  Ms. Chris Astudillo is a 78 y.o. female with RA who is on Leflunomide is seen for a paraproteinemia    PLAN  MGUS:  IgG MGUS- no evidence of end organ damage  Reviewed the spectrum of plasma cell disorders   Will repeat labs and obtain a skeletal survey to identify where she falls  Rarely Lymphomas may be associated with a paraproteinemia but I doubt that in her case    - cbc diff, cmp, ld, gammopathy eval, bone survey    Leucopenia  Likely secondary to Leflunomide  Will monitor    RA  Management per Dr. Vielka Oliva      RTC 2 weeks    Cuate Peres MD    Ms. Chris Astudillo has a reminder for a \"due or due soon\" health maintenance. I have asked that she contact her primary care provider for follow-up on this health maintenance.

## 2017-10-24 LAB
ALBUMIN SERPL ELPH-MCNC: 4.1 G/DL (ref 2.9–4.4)
ALBUMIN SERPL-MCNC: 4.7 G/DL (ref 3.6–4.8)
ALBUMIN/GLOB SERPL: 1.7 {RATIO} (ref 0.7–1.7)
ALBUMIN/GLOB SERPL: 2.5 {RATIO} (ref 1.2–2.2)
ALP SERPL-CCNC: 114 IU/L (ref 39–117)
ALPHA1 GLOB SERPL ELPH-MCNC: 0.2 G/DL (ref 0–0.4)
ALPHA2 GLOB SERPL ELPH-MCNC: 0.7 G/DL (ref 0.4–1)
ALT SERPL-CCNC: 55 IU/L (ref 0–32)
AST SERPL-CCNC: 31 IU/L (ref 0–40)
B-GLOBULIN SERPL ELPH-MCNC: 1.1 G/DL (ref 0.7–1.3)
BASOPHILS # BLD AUTO: 0 X10E3/UL (ref 0–0.2)
BASOPHILS NFR BLD AUTO: 0 %
BILIRUB SERPL-MCNC: 0.3 MG/DL (ref 0–1.2)
BUN SERPL-MCNC: 11 MG/DL (ref 8–27)
BUN/CREAT SERPL: 14 (ref 12–28)
CALCIUM SERPL-MCNC: 9.4 MG/DL (ref 8.7–10.3)
CHLORIDE SERPL-SCNC: 102 MMOL/L (ref 96–106)
CO2 SERPL-SCNC: 23 MMOL/L (ref 18–29)
CREAT SERPL-MCNC: 0.76 MG/DL (ref 0.57–1)
EOSINOPHIL # BLD AUTO: 0.1 X10E3/UL (ref 0–0.4)
EOSINOPHIL NFR BLD AUTO: 4 %
ERYTHROCYTE [DISTWIDTH] IN BLOOD BY AUTOMATED COUNT: 14.4 % (ref 12.3–15.4)
GAMMA GLOB SERPL ELPH-MCNC: 0.4 G/DL (ref 0.4–1.8)
GFR SERPLBLD CREATININE-BSD FMLA CKD-EPI: 81 ML/MIN/1.73
GFR SERPLBLD CREATININE-BSD FMLA CKD-EPI: 94 ML/MIN/1.73
GLOBULIN SER CALC-MCNC: 1.9 G/DL (ref 1.5–4.5)
GLOBULIN SER-MCNC: 2.5 G/DL (ref 2.2–3.9)
GLUCOSE SERPL-MCNC: 81 MG/DL (ref 65–99)
HCT VFR BLD AUTO: 39.8 % (ref 34–46.6)
HGB BLD-MCNC: 13.4 G/DL (ref 11.1–15.9)
IGA SERPL-MCNC: 155 MG/DL (ref 87–352)
IGG SERPL-MCNC: 522 MG/DL (ref 700–1600)
IGM SERPL-MCNC: 38 MG/DL (ref 26–217)
IMM GRANULOCYTES # BLD: 0 X10E3/UL (ref 0–0.1)
IMM GRANULOCYTES NFR BLD: 0 %
INTERPRETATION SERPL IEP-IMP: ABNORMAL
KAPPA LC FREE SER-MCNC: 13.1 MG/L (ref 3.3–19.4)
KAPPA LC FREE/LAMBDA FREE SER: 0.65 {RATIO} (ref 0.26–1.65)
LAMBDA LC FREE SERPL-MCNC: 20.2 MG/L (ref 5.7–26.3)
LDH SERPL-CCNC: 175 IU/L (ref 119–226)
LYMPHOCYTES # BLD AUTO: 1.6 X10E3/UL (ref 0.7–3.1)
LYMPHOCYTES NFR BLD AUTO: 43 %
M PROTEIN SERPL ELPH-MCNC: 0.2 G/DL
MCH RBC QN AUTO: 28.8 PG (ref 26.6–33)
MCHC RBC AUTO-ENTMCNC: 33.7 G/DL (ref 31.5–35.7)
MCV RBC AUTO: 85 FL (ref 79–97)
MONOCYTES # BLD AUTO: 0.3 X10E3/UL (ref 0.1–0.9)
MONOCYTES NFR BLD AUTO: 9 %
NEUTROPHILS # BLD AUTO: 1.6 X10E3/UL (ref 1.4–7)
NEUTROPHILS NFR BLD AUTO: 44 %
PLATELET # BLD AUTO: 260 X10E3/UL (ref 150–379)
PLEASE NOTE:, 149534: ABNORMAL
POTASSIUM SERPL-SCNC: 4.2 MMOL/L (ref 3.5–5.2)
PROT SERPL-MCNC: 6.6 G/DL (ref 6–8.5)
RBC # BLD AUTO: 4.66 X10E6/UL (ref 3.77–5.28)
SODIUM SERPL-SCNC: 142 MMOL/L (ref 134–144)
WBC # BLD AUTO: 3.6 X10E3/UL (ref 3.4–10.8)

## 2017-10-26 ENCOUNTER — HOSPITAL ENCOUNTER (OUTPATIENT)
Dept: GENERAL RADIOLOGY | Age: 67
Discharge: HOME | End: 2017-10-26
Attending: INTERNAL MEDICINE
Payer: MEDICARE

## 2017-10-26 DIAGNOSIS — D47.2 MGUS (MONOCLONAL GAMMOPATHY OF UNKNOWN SIGNIFICANCE): ICD-10-CM

## 2017-10-26 PROCEDURE — 77075 RADEX OSSEOUS SURVEY COMPL: CPT

## 2017-11-08 ENCOUNTER — OFFICE VISIT (OUTPATIENT)
Dept: ONCOLOGY | Age: 67
End: 2017-11-08

## 2017-11-08 VITALS
RESPIRATION RATE: 20 BRPM | SYSTOLIC BLOOD PRESSURE: 128 MMHG | HEART RATE: 74 BPM | BODY MASS INDEX: 28.7 KG/M2 | TEMPERATURE: 98.7 F | WEIGHT: 162 LBS | OXYGEN SATURATION: 100 % | DIASTOLIC BLOOD PRESSURE: 76 MMHG | HEIGHT: 63 IN

## 2017-11-08 DIAGNOSIS — D72.819 LEUKOPENIA, UNSPECIFIED TYPE: ICD-10-CM

## 2017-11-08 DIAGNOSIS — M05.79 SEROPOSITIVE RHEUMATOID ARTHRITIS OF MULTIPLE SITES (HCC): ICD-10-CM

## 2017-11-08 DIAGNOSIS — D47.2 MGUS (MONOCLONAL GAMMOPATHY OF UNKNOWN SIGNIFICANCE): Primary | ICD-10-CM

## 2017-11-08 NOTE — MR AVS SNAPSHOT
Visit Information Date & Time Provider Department Dept. Phone Encounter #  
 11/8/2017  9:30 AM Michael Bynum MD [facility] On[facility] at [address] [id number] Follow-up Instructions Return in about 6 months (around 5/8/2018). Follow-up and Disposition History Your Appointments 12/28/2017  9:40 AM  
ESTABLISHED PATIENT with Leila Cabral MD  
[address] ([facility]) Appt Note: 3 mo f/u  
 [address]at[address] [facility] [address]  
[phone]  
  
   
 [address] [id number] Upcoming Health Maintenance Date Due DTaP/Tdap/Td series (1 - Tdap) 10/22/1970 BREAST CANCER SCRN MAMMOGRAM 10/22/1999 FOBT Q 1 YEAR AGE 50-75 10/22/1999 ZOSTER VACCINE AGE 60> 8/22/2009 OSTEOPOROSIS SCREENING (DEXA) 10/22/2014 Pneumococcal 65+ High/Highest Risk (1 of 2 - PCV13) 10/22/2014 MEDICARE YEARLY EXAM 10/22/2014 Influenza Age 5 to Adult 8/1/2017 GLAUCOMA SCREENING Q2Y 7/27/2019 Allergies as of 11/8/2017  Review Complete On: 11/8/2017 By: Michael Bynum MD  
  
 Severity Noted Reaction Type Reactions Nickel  06/28/2017    Rash Current Immunizations  Never Reviewed No immunizations on file. Not reviewed this visit You Were Diagnosed With   
  
 Codes Comments MGUS (monoclonal gammopathy of unknown significance)    -  Primary ICD-10-CM: S12.4 ICD-9-CM: 273.1 Seropositive rheumatoid arthritis of multiple sites [facility])     ICD-10-CM: M05.79 ICD-9-CM: 714.0 Leukopenia, unspecified type     ICD-10-CM: D72.819 ICD-9-CM: 288.50 Vitals BP Pulse Temp Resp Height(growth percentile) Weight(growth percentile) 128/76 74 98.7 °F (37.1 °C) 20 5' 3\" (1.6 m) 162 lb (73.5 kg) SpO2 BMI OB Status Smoking Status 100% 28.7 kg/m2 Postmenopausal Former Smoker Vitals History BMI and BSA Data Body Mass Index Body Surface Area 28.7 kg/m 2 1.81 m 2 Preferred Pharmacy Pharmacy Name Phone [facility] [address] [phone] Your Updated Medication List  
  
   
This list is accurate as of: 11/8/17 10:39 AM.  Always use your most recent med list.  
  
  
  
  
 ergocalciferol 50,000 unit capsule Commonly known as:  ERGOCALCIFEROL Take 1 Cap by mouth every seven (7) days. fluconazole 150 mg tablet Commonly known as:  DIFLUCAN  
  
 folic acid 1 mg tablet Commonly known as:  Félix Take  by mouth daily. leflunomide 20 mg tablet Commonly known as:  Joyce Cedillo Take 1 Tab by mouth daily. Take half a tab daily for 7 days then increase to 1 tab daily if tolerated. methotrexate 2.5 mg tablet Commonly known as:  Rigideona Cleverly Take 20 mg by mouth every Tuesday. multivitamin tablet Commonly known as:  ONE A DAY Take 1 Tab by mouth daily. nystatin 100,000 unit/mL suspension Commonly known as:  MYCOSTATIN  
  
 OTHER Calcium/magnesium/zinc combination VITAMIN B-12 1,000 mcg tablet Generic drug:  cyanocobalamin Take 1,000 mcg by mouth daily. Follow-up Instructions Return in about 6 months (around 5/8/2018). To-Do List   
 05/08/2018 Lab:  CBC WITH AUTOMATED DIFF   
  
 05/08/2018 Lab:  METABOLIC PANEL, COMPREHENSIVE   
  
 05/08/2018 Lab:  PROTEIN ELECTROPHORESIS Introducing [facility]! [employer] introduces [employer] patient portal. Now you can access parts of your medical record, email your doctor's office, and request medication refills online. 1. In your internet browser, go to [email or web address] 2. Click on the First Time User? Click Here link in the Sign In box. You will see the New Member Sign Up page. 3. Enter your [employer] Access Code exactly as it appears below. You will not need to use this code after youve completed the sign-up process.  If you do not sign up before the expiration date, you must request a new code. · [employer] Access Code: [email or web address] Expires: 12/27/2017  9:19 AM 
 
4. Enter the last four digits of your Social Security Number (xxxx) and Date of Birth (mm/dd/yyyy) as indicated and click Submit. You will be taken to the next sign-up page. 5. Create a [employer] ID. This will be your [employer] login ID and cannot be changed, so think of one that is secure and easy to remember. 6. Create a [employer] password. You can change your password at any time. 7. Enter your Password Reset Question and Answer. This can be used at a later time if you forget your password. 8. Enter your e-mail address. You will receive e-mail notification when new information is available in [address] 9. Click Sign Up. You can now view and download portions of your medical record. 10. Click the Download Summary menu link to download a portable copy of your medical information. If you have questions, please visit the Frequently Asked Questions section of the [employer] website. Remember, [employer] is NOT to be used for urgent needs. For medical emergencies, dial 911. Now available from your iPhone and Android! Please provide this summary of care documentation to your next provider. Your primary care clinician is listed as Jessica Willoughby. If you have any questions after today's visit, please call [phone].

## 2017-11-08 NOTE — PROGRESS NOTES
Hematology/Oncology progress note    REASON FOR VISIT: MGUS  REQUESTED BY: Dr. Rufino Montgomery: Ms. Rowland  is a 75 y.o. female with seropositive RA presents for follow up of results    She is currently on Leflunomide. She has been off Methotrexate since Aug 2017. She was noted to have 0.2 g/L of  IgG monoclonal protein with lambda light chain Specificity. No evidence of anemia or renal failure. Skeletal survey negative for lytic lesions    She feels the same with no new concerns. No fevers, chills, CP, SOB, HA, vision changes, no Falls, rashes, recent diarrhea, dysuria. Has chronic sinusitis. No new aches or pains. No changes in weight or appetite. Younger brother has downs syndrome and diagnosed with NHL. No other relevant FH. She smoked 42 years ago. No ETOH . She has not been getting mammograms    Past Medical History:   Diagnosis Date    Rheumatoid arthritis ([facility] [address]        Past Surgical History:   Procedure Laterality Date    HX BREAST BIOPSY      HX  SECTION         Allergies   Allergen Reactions    Nickel Rash       Current Outpatient Prescriptions   Medication Sig Dispense Refill    cyanocobalamin (VITAMIN B-12) 1,000 mcg tablet Take 1,000 mcg by mouth daily.  OTHER Calcium/magnesium/zinc combination      folic acid (FOLVITE) 1 mg tablet Take  by mouth daily.  ergocalciferol (ERGOCALCIFEROL) 50,000 unit capsule Take 1 Cap by mouth every seven (7) days. 12 Cap 3    leflunomide (ARAVA) 20 mg tablet Take 1 Tab by mouth daily. Take half a tab daily for 7 days then increase to 1 tab daily if tolerated. 90 Tab 0    multivitamin (ONE A DAY) tablet Take 1 Tab by mouth daily.  fluconazole (DIFLUCAN) 150 mg tablet       nystatin (MYCOSTATIN) 100,000 unit/mL suspension       methotrexate (RHEUMATREX) 2.5 mg tablet Take 20 mg by mouth every Tuesday.          Social History     Social History    Marital status: SINGLE     Spouse name: N/A    Number of children: N/A    Years of education: N/A     Social History Main Topics    Smoking status: Former Smoker    Smokeless tobacco: Never Used    Alcohol use Yes      Comment: 2 drinks/wk    Drug use: No    Sexual activity: Not Asked     Other Topics Concern    None     Social History Narrative       Family History   Problem Relation Age of Onset    Diabetes Mother     Heart Attack Father     Parkinson's Disease Maternal Grandmother     Arthritis-rheumatoid Paternal Grandmother        ROS  A review of systems was obtained and is negative except as listed in HPI. ECOG PS is 0    Physical Examination:   Visit Vitals    /76    Pulse 74    Temp 98.7 °F (37.1 °C)    Resp 20    Ht 5' 3\" (1.6 m)    Wt 162 lb (73.5 kg)    SpO2 100%    BMI 28.7 kg/m2     General appearance - alert, well appearing, and in no distress  Mental status - oriented to person, place, and time  Mouth - mucous membranes moist, pharynx normal without lesions  Neck - supple, no significant adenopathy  Lymphatics - no palpable lymphadenopathy, no hepatosplenomegaly  Chest - clear to auscultation, no wheezes, rales or rhonchi, symmetric air entry  Heart - normal rate, regular rhythm, normal S1, S2, no murmurs, rubs, clicks or gallops  Abdomen - soft, nontender, nondistended, no masses or organomegaly, bowel sounds presen  Extremities - peripheral pulses normal, no pedal edema, no clubbing or cyanosis  Skin - normal coloration and turgor, no rashes, no suspicious skin lesions noted    LABS  Lab Results   Component Value Date/Time    WBC 3.6 10/19/2017 02:49 PM    HGB 13.4 10/19/2017 02:49 PM    HCT 39.8 10/19/2017 02:49 PM    PLATELET 129 65/42/2027 02:49 PM    MCV 85 10/19/2017 02:49 PM    ABS.  NEUTROPHILS 1.6 10/19/2017 02:49 PM     Lab Results   Component Value Date/Time    Sodium 142 10/19/2017 02:49 PM    Potassium 4.2 10/19/2017 02:49 PM    Chloride 102 10/19/2017 02:49 PM    CO2 23 10/19/2017 02:49 PM    Glucose 81 10/19/2017 02:49 PM    BUN 11 10/19/2017 02:49 PM    Creatinine 0.76 10/19/2017 02:49 PM    GFR est AA 94 10/19/2017 02:49 PM    GFR est non-AA 81 10/19/2017 02:49 PM    Calcium 9.4 10/19/2017 02:49 PM     Lab Results   Component Value Date/Time    AST (SGOT) 31 10/19/2017 02:49 PM    Alk. phosphatase 114 10/19/2017 02:49 PM    Protein, total 6.6 10/19/2017 02:49 PM    Albumin 4.7 10/19/2017 02:49 PM    A-G Ratio 2.5 10/19/2017 02:49 PM     Lab Results   Component Value Date/Time     10/19/2017 02:49 PM    Sed rate (ESR) 14 09/28/2017 11:19 AM    C-Reactive Protein, Qt 1.7 09/28/2017 11:19 AM    M-spike 0.2 06/28/2017 11:23 AM    M-Nash 0.2 10/19/2017 02:49 PM     Component      Latest Ref Rng & Units 10/19/2017           2:49 PM   Free Kappa Lt Chains, serum      3.3 - 19.4 mg/L 13.1   Free Lambda Lt Chains, serum      5.7 - 26.3 mg/L 20.2   Kappa/Lambda ratio, serum      0.26 - 1.65 0.65     Skeletal survey  No lytic lesions      ASSESSMENT  Ms. Payton Car is a 75 y.o. female with RA who is on Leflunomide is seen for MGUS    PLAN  MGUS: Low risk  IgG MGUS- no evidence of end organ damage  Reviewed the spectrum of plasma cell disorders   Has low risk disease with a 5% risk of transformation to MM in 20 years    Reviewed the full spectrum of plasma cell disorders. Information on MGUS given in print.     - cbc diff, cmp, SPEP in 6 months    Leucopenia  Likely secondary to Leflunomide  Will monitor    RA  Management per Dr. Zavaleta Or    RTC 6 months    Yumiko Mendenhall MD    Ms. Payton Car has a reminder for a \"due or due soon\" health maintenance. I have asked that she contact her primary care provider for follow-up on this health maintenance.

## 2018-01-10 ENCOUNTER — OFFICE VISIT (OUTPATIENT)
Dept: RHEUMATOLOGY | Age: 68
End: 2018-01-10

## 2018-01-10 VITALS
SYSTOLIC BLOOD PRESSURE: 115 MMHG | HEIGHT: 63 IN | RESPIRATION RATE: 18 BRPM | TEMPERATURE: 97.8 F | WEIGHT: 161 LBS | DIASTOLIC BLOOD PRESSURE: 80 MMHG | BODY MASS INDEX: 28.53 KG/M2 | HEART RATE: 93 BPM

## 2018-01-10 DIAGNOSIS — Z79.60 LONG-TERM USE OF IMMUNOSUPPRESSANT MEDICATION: ICD-10-CM

## 2018-01-10 DIAGNOSIS — M19.042 PRIMARY OSTEOARTHRITIS OF BOTH HANDS: ICD-10-CM

## 2018-01-10 DIAGNOSIS — D47.2 MGUS (MONOCLONAL GAMMOPATHY OF UNKNOWN SIGNIFICANCE): ICD-10-CM

## 2018-01-10 DIAGNOSIS — M65.30 STENOSING TENOSYNOVITIS OF FINGER: ICD-10-CM

## 2018-01-10 DIAGNOSIS — M19.041 PRIMARY OSTEOARTHRITIS OF BOTH HANDS: ICD-10-CM

## 2018-01-10 DIAGNOSIS — M17.0 PRIMARY OSTEOARTHRITIS OF BOTH KNEES: ICD-10-CM

## 2018-01-10 DIAGNOSIS — E55.9 VITAMIN D DEFICIENCY: ICD-10-CM

## 2018-01-10 DIAGNOSIS — M35.00 SECONDARY SJOGREN'S SYNDROME (HCC): ICD-10-CM

## 2018-01-10 DIAGNOSIS — M05.79 SEROPOSITIVE RHEUMATOID ARTHRITIS OF MULTIPLE SITES (HCC): Primary | ICD-10-CM

## 2018-01-10 RX ORDER — FOLIC ACID 1 MG/1
1 TABLET ORAL DAILY
Qty: 90 TAB | Refills: 0 | Status: CANCELLED | OUTPATIENT
Start: 2018-01-10

## 2018-01-10 RX ORDER — LEFLUNOMIDE 20 MG/1
20 TABLET ORAL DAILY
Qty: 90 TAB | Refills: 0 | Status: CANCELLED | OUTPATIENT
Start: 2018-01-10

## 2018-01-10 NOTE — MR AVS SNAPSHOT
Visit Information Date & Time Provider Department Dept. Phone Encounter #  
 1/10/2018  3:40 PM Fabio Rogers, [address] [phone] [id number] Follow-up Instructions Return in about 3 months (around 4/10/2018). Your Appointments 5/8/2018  9:00 AM  
Follow Up with Roise Kayser, MD  
[facility] at [facility]) Appt Note: 6 month f/u- MGUS  
 [address] [id number]  
[phone]  
  
   
 [address] [address] Upcoming Health Maintenance Date Due DTaP/Tdap/Td series (1 - Tdap) 10/22/1970 BREAST CANCER SCRN MAMMOGRAM 10/22/1999 FOBT Q 1 YEAR AGE 50-75 10/22/1999 ZOSTER VACCINE AGE 60> 8/22/2009 OSTEOPOROSIS SCREENING (DEXA) 10/22/2014 Pneumococcal 65+ High/Highest Risk (1 of 2 - PCV13) 10/22/2014 MEDICARE YEARLY EXAM 10/22/2014 Influenza Age 5 to Adult 8/1/2017 GLAUCOMA SCREENING Q2Y 7/27/2019 Allergies as of 1/10/2018  Review Complete On: 1/10/2018 By: Fabio Rogers MD  
  
 Severity Noted Reaction Type Reactions Nickel  06/28/2017    Rash Current Immunizations  Never Reviewed No immunizations on file. Not reviewed this visit You Were Diagnosed With   
  
 Codes Comments Seropositive rheumatoid arthritis of multiple sites [facility])    -  Primary ICD-10-CM: M05.79 ICD-9-CM: 714.0 Long-term use of immunosuppressant medication     ICD-10-CM: Z79.899 ICD-9-CM: V58.69 Secondary Sjogren's syndrome ([facility] [address]     ICD-10-CM: M35.00 ICD-9-CM: 710.2 Vitals BP Pulse Temp Resp Height(growth percentile) Weight(growth percentile) 115/80 (BP 1 Location: Right arm, BP Patient Position: Sitting) 93 97.8 °F (36.6 °C) 18 5' 3\" (1.6 m) 161 lb (73 kg) BMI OB Status Smoking Status 28.52 kg/m2 Postmenopausal Former Smoker BMI and BSA Data Body Mass Index Body Surface Area 28.52 kg/m 2 1.8 m 2 Preferred Pharmacy Pharmacy Name Phone [facility] [address] [place] [address] [phone] Your Updated Medication List  
  
   
This list is accurate as of: 1/10/18  4:13 PM.  Always use your most recent med list.  
  
  
  
  
 ergocalciferol 50,000 unit capsule Commonly known as:  ERGOCALCIFEROL Take 1 Cap by mouth every seven (7) days. folic acid 1 mg tablet Commonly known as:  Google Take  by mouth daily. leflunomide 20 mg tablet Commonly known as:  [address] Take 1 Tab by mouth daily. Take half a tab daily for 7 days then increase to 1 tab daily if tolerated. methotrexate 2.5 mg tablet Commonly known as:  Jared Mcrae Take 20 mg by mouth every Tuesday. multivitamin tablet Commonly known as:  ONE A DAY Take 1 Tab by mouth daily. nystatin 100,000 unit/mL suspension Commonly known as:  MYCOSTATIN  
  
 OTHER Calcium/magnesium/zinc combination  
  
 tofacitinib 11 mg Tb24 Commonly known as:  XELJANZ XR Take 11 mg by mouth daily for 30 days. Prescriptions Sent to Pharmacy Refills  
 tofacitinib (XELJANZ XR) 11 mg Tb24 11 Sig: Take 11 mg by mouth daily for 30 days. Class: Normal  
 Pharmacy: [address] Ph #: [phone] Route: Oral  
  
Follow-up Instructions Return in about 3 months (around 4/10/2018). Patient Instructions Tofacitinib (By mouth) Tofacitinib (toe-fa-SYE-ti-nib) Treats rheumatoid arthritis. Brand Name(s): Agueda Hammed XR There may be other brand names for this medicine. When This Medicine Should Not Be Used: This medicine is not right for everyone. Do not use it if you had an allergic reaction to tofacitinib. How to Use This Medicine:  
Tablet, Long Acting Tablet · Your doctor will tell you how much medicine to use. Do not use more than directed. · Swallow the extended-release tablet whole. Do not crush, break, or chew it. · If you take the extended-release tablet, part of the tablet may pass into your stools. This is normal and is nothing to worry about. · This medicine should come with a Medication Guide. Ask your pharmacist for a copy if you do not have one. · Missed dose: Take a dose as soon as you remember. If it is almost time for your next dose, wait until then and take a regular dose. Do not take extra medicine to make up for a missed dose. · Store the medicine in a closed container at room temperature, away from heat, moisture, and direct light. Drugs and Foods to Avoid: Ask your doctor or pharmacist before using any other medicine, including over-the-counter medicines, vitamins, and herbal products. · Some medicines can affect how tofacitinib works. Tell your doctor if you are using any of the following: ¨ Medicines that weaken your immune system (including azathioprine, cyclosporine, methotrexate, tacrolimus) ¨ Medicine to treat infection (including fluconazole, ketoconazole, rifampin) ¨ NSAID medicine (including aspirin, diclofenac, ibuprofen, naproxen) ¨ Steroid medicine (including dexamethasone, hydrocortisone, methylprednisolone, prednisolone, prednisone) · This medicine may interfere with vaccines. Ask your doctor before you get a flu shot or any other vaccines. Warnings While Using This Medicine: · It is not safe to take this medicine during pregnancy. It could harm an unborn baby. Tell your doctor right away if you become pregnant. Use an effective form of birth control during treatment with this medicine and for at least 4 weeks after your last dose. · Do not breastfeed while you are using this medicine.  
· Tell your doctor if you have kidney disease, liver disease (including hepatitis B or C), lung disease (including interstitial lung disease), HIV, cancer or a history of cancer, blood problems, diabetes, high cholesterol, stomach or bowel problems (including blockage, diverticulitis, ulcers), or a history of tuberculosis. Also tell your doctor if you have a current infection or an infection that keeps coming back. · This medicine may cause the following problems: 
¨ Increased risk for serious infections, including herpes infection or shingles ¨ Increased risk for certain cancers, including nonmelanoma skin cancer or lymphoma ¨ Stomach or bowel perforation (tear or hole) ¨ High cholesterol in the blood · Talk with your doctor before using this medicine if you plan to have children. Some women who use this medicine have become infertile (unable to have children). · This medicine may make you bleed, bruise, or get infections more easily. Take precautions to prevent illness and injury. Wash your hands often. · Your doctor will do lab tests at regular visits to check on the effects of this medicine. Keep all appointments. · Keep all medicine out of the reach of children. Never share your medicine with anyone. Possible Side Effects While Using This Medicine:  
Call your doctor right away if you notice any of these side effects: · Allergic reaction: Itching or hives, swelling in your face or hands, swelling or tingling in your mouth or throat, chest tightness, trouble breathing · Change in how much or how often you urinate, difficult or painful urination · Dark urine or pale stools, nausea, vomiting, loss of appetite, stomach pain, yellow skin or eyes · Fever, chills, cough, difficulty breathing, runny or stuffy nose, sore throat, night sweats, body aches · Skin or mole changes, sores that do not heal 
· Stomach pain, cramping, bloody stools · Swollen glands in your neck, armpits, or groin · Unusual bleeding, bruising, or weakness If you notice other side effects that you think are caused by this medicine, tell your doctor. Call your doctor for medical advice about side effects.  You may report side effects to FDA at 1-800-FDA-1088 © 2017 [facility] Information is for End User's use only and may not be sold, redistributed or otherwise used for commercial purposes. The above information is an  only. It is not intended as medical advice for individual conditions or treatments. Talk to your doctor, nurse or pharmacist before following any medical regimen to see if it is safe and effective for you. Introducing [facility]! Cynthia Sterling introduces Quipper patient portal. Now you can access parts of your medical record, email your doctor's office, and request medication refills online. 1. In your internet browser, go to [email or web address] 2. Click on the First Time User? Click Here link in the Sign In box. You will see the New Member Sign Up page. 3. Enter your Quipper Access Code exactly as it appears below. You will not need to use this code after youve completed the sign-up process. If you do not sign up before the expiration date, you must request a new code. · Quipper Access Code: [email or web address] Expires: 4/10/2018  3:38 PM 
 
4. Enter the last four digits of your Social Security Number (xxxx) and Date of Birth (mm/dd/yyyy) as indicated and click Submit. You will be taken to the next sign-up page. 5. Create a Quipper ID. This will be your Quipper login ID and cannot be changed, so think of one that is secure and easy to remember. 6. Create a Quipper password. You can change your password at any time. 7. Enter your Password Reset Question and Answer. This can be used at a later time if you forget your password. 8. Enter your e-mail address. You will receive e-mail notification when new information is available in [address] 9. Click Sign Up. You can now view and download portions of your medical record. 10. Click the Download Summary menu link to download a portable copy of your medical information. If you have questions, please visit the Frequently Asked Questions section of the [email or web address] website. Remember, 7write is NOT to be used for urgent needs. For medical emergencies, dial 911. Now available from your iPhone and Android! Please provide this summary of care documentation to your next provider. Your primary care clinician is listed as [place] . If you have any questions after today's visit, please call [phone].

## 2018-01-10 NOTE — PROGRESS NOTES
REASON FOR VISIT    This is a follow-up visit for Ms. Randi Delgado for Seropositive Rheumatoid Arthritis. Inflammatory arthritis phenotype includes:  Anti-CCP positive: yes (>250)  Rheumatoid factor positive: yes (53.8)  Erosive disease: no  Extra-articular manifestations include: Sjogren's Syndrome    Immunosuppression Screening (6/28/2017): Quantiferon TB: negative  PPD:  Not performed  Hepatitis B: negative  Hepatitis C: negative    Therapy History includes:  Current DMARD therapy includes: none  Prior DMARD therapy includes: hydroxychloroquine 400 daily, methotrexate 20 mg every Tuesday, leflunomide 20 mg   The following DMARDs have been ineffective: leflunomide  The following DMARDs were stopped because of side effects: methotrexate (nausea)    Immunizations: There is no immunization history on file for this patient. Active problems include:    Patient Active Problem List   Diagnosis Code    [facility] M05.79    Long-term use of immunosuppressant medication Z79.899    Primary osteoarthritis of both knees M17.0    Primary osteoarthritis of both hands M19.041, M19.042    Secondary Sjogren's syndrome (HCC) M35.00    Vitamin D deficiency E55.9    MGUS (monoclonal gammopathy of unknown significance) D47.2    Stenosing tenosynovitis of finger M65.30    Leukopenia D72.819       HISTORY OF PRESENT ILLNESS    Ms. Randi Delgado returns for a follow-up. On her last visit, I continued leflunomide 20 mg daily, but she has been off for the two weeks due to needing refills. Today, she feels well. She has some hand pain, swelling in her left 2nd finger and some stiffness in the morning lasting an hour. She feels about the same now as while she was on leflunomide. She is planning on a hysterectomy . Ms. Randi Delgado has continued her medications for arthritis and reports good tolerance without significant side effects.      Last toxicity monitoring by blood work was done on 9/28/2017 and did not reveal any significant adverse effects, except ALK 55 U/L. Most recent inflammatory markers from 9/28/2017 revealed a ESR 14 mm/hr (previously 2, 9 mm/hr) and CRP 1.7 mg/L (previously 0.7, 0.5 mg/L). The patient has not had any interval hospital admissions, infections, or surgeries. REVIEW OF SYSTEMS    A comprehensive review of systems was performed and pertinent results are documented in the HPI, review of systems is otherwise non-contributory. PAST MEDICAL HISTORY    She has a past medical history of Rheumatoid arthritis ([address] FAMILY HISTORY    Her family history includes Arthritis-rheumatoid in her paternal grandmother; Diabetes in her mother; Heart Attack in her father; Parkinson's Disease in her maternal grandmother. SOCIAL HISTORY    She reports that she has quit smoking. She has never used smokeless tobacco. She reports that she drinks alcohol. She reports that she does not use illicit drugs. MEDICATIONS    Current Outpatient Prescriptions   Medication Sig Dispense Refill    tofacitinib (XELJANZ XR) 11 mg Tb24 Take 11 mg by mouth daily for 30 days. 30 Tab 11    folic acid (FOLVITE) 1 mg tablet Take  by mouth daily.  ergocalciferol (ERGOCALCIFEROL) 50,000 unit capsule Take 1 Cap by mouth every seven (7) days. 12 Cap 3    multivitamin (ONE A DAY) tablet Take 1 Tab by mouth daily. ALLERGIES    Allergies   Allergen Reactions    Nickel Rash       PHYSICAL EXAMINATION    Visit Vitals    /80 (BP 1 Location: Right arm, BP Patient Position: Sitting)    Pulse 93    Temp 97.8 °F (36.6 °C)    Resp 18    Ht 5' 3\" (1.6 m)    Wt 161 lb (73 kg)    BMI 28.52 kg/m2     Body mass index is 28.52 kg/(m^2). General: Patient is alert, oriented x 3, not in acute distress    HEENT:   Sclerae are not injected and appear moist.  Oral mucous membranes are moist, there are no ulcers present. Left lacrimal gland enlargement.  Sight enlargement of parotids. Moist mucous with good salivary pooling. There is no alopecia. Neck is supple. Cardiovascular:  Heart is regular rate and rhythm, no murmurs. Chest:  Lungs are clear to auscultation bilaterally. No rhonchi, wheezes, or crackles. Extremities:  Free of clubbing, cyanosis, edema    Neurological exam:  No focal sensory deficits, muscle strength is full in upper and lower extremities     Skin exam:  There are no rashes, no alopecia, no discoid lesions, no active Raynaud's, no livedo reticularis, no periungual erythema. Musculoskeletal exam:  A comprehensive musculoskeletal exam was performed for all joints of each upper and lower extremity and assessed for swelling, tenderness and range of motion. Positive results are documented as below:    Bilateral Gabe and Heberden nodes. Bilateral knee crepitus without effusion.   Left 2nd A1 pulley crepitus and tenderness    Joint Count 1/10/2018 9/28/2017 6/28/2017   Patient pain (0-100) 10 20 0   MHAQ 0 0 0   Left wrist- Tender 1 - -   Left wrist- Swollen 1 - -   Left 3rd MCP - Swollen 1 1 -   Left 5th MCP - Swollen - 1 -   Left 2nd PIP - Tender 1 1 -   Left 2nd PIP - Swollen 1 1 -   Right 2nd MCP - Swollen - 1 -   Right 3rd MCP - Swollen 1 1 -   Right 3rd PIP - Tender 1 - 0   Right 3rd PIP - Swollen 1 1 1   Tender Joint Count (Total) 3 1 0   Swollen Joint Count (Total) 5 6 1   Physician Assessment (0-10) 3 2 0   Patient Assessment (0-10) 1 2 0   CDAI Total (calculated) 12 11 1       DATA REVIEW    Laboratory     The following laboratory results were reviewed and discussed with the patient:    Orders Only on 10/19/2017   Component Date Value    Immunoglobulin G, Qt. 10/19/2017 522*    Immunoglobulin A, Qt. 10/19/2017 155     Immunoglobulin M, Qt. 10/19/2017 38     Protein, total 10/19/2017 6.6     Albumin 10/19/2017 4.1     Alpha-1-Globulin 10/19/2017 0.2     Alpha-2-Globulin 10/19/2017 0.7     Beta Globulin 10/19/2017 1.1     Gamma Globulin 10/19/2017 0.4     M-Nash 10/19/2017 0.2*    Globulin, total 10/19/2017 2.5     A/G ratio 10/19/2017 1.7     Immunofixation Result 10/19/2017 Comment     Please note 10/19/2017 Comment     Free Kappa Lt Chains, se* 10/19/2017 13.1     Free Lambda Lt Chains, s* 10/19/2017 20.2     Kappa/Lambda ratio, serum 10/19/2017 0.65     WBC 10/19/2017 3.6     RBC 10/19/2017 4.66     HGB 10/19/2017 13.4     HCT 10/19/2017 39.8     MCV 10/19/2017 85     MCH 10/19/2017 28.8     MCHC 10/19/2017 33.7     RDW 10/19/2017 14.4     PLATELET 57/17/5449 920     NEUTROPHILS 10/19/2017 44     Lymphocytes 10/19/2017 43     MONOCYTES 10/19/2017 9     EOSINOPHILS 10/19/2017 4     BASOPHILS 10/19/2017 0     ABS. NEUTROPHILS 10/19/2017 1.6     Abs Lymphocytes 10/19/2017 1.6     ABS. MONOCYTES 10/19/2017 0.3     ABS. EOSINOPHILS 10/19/2017 0.1     ABS. BASOPHILS 10/19/2017 0.0     IMMATURE GRANULOCYTES 10/19/2017 0     ABS. IMM. GRANS. 10/19/2017 0.0     Glucose 10/19/2017 81     BUN 10/19/2017 11     Creatinine 10/19/2017 0.76     GFR est non-AA 10/19/2017 81     GFR est AA 10/19/2017 94     BUN/Creatinine ratio 10/19/2017 14     Sodium 10/19/2017 142     Potassium 10/19/2017 4.2     Chloride 10/19/2017 102     CO2 10/19/2017 23     Calcium 10/19/2017 9.4     Albumin 10/19/2017 4.7     GLOBULIN, TOTAL 10/19/2017 1.9     A-G Ratio 10/19/2017 2.5*    Bilirubin, total 10/19/2017 0.3     Alk.  phosphatase 10/19/2017 114     AST (SGOT) 10/19/2017 31     ALT (SGPT) 10/19/2017 55*    LD 10/19/2017 175    Office Visit on 09/28/2017   Component Date Value    WBC 09/28/2017 3.1*    RBC 09/28/2017 4.42     HGB 09/28/2017 12.9     HCT 09/28/2017 37.5     MCV 09/28/2017 85     MCH 09/28/2017 29.2     MCHC 09/28/2017 34.4     RDW 09/28/2017 14.8     PLATELET 65/64/0676 850     NEUTROPHILS 09/28/2017 47     Lymphocytes 09/28/2017 37     MONOCYTES 09/28/2017 11     EOSINOPHILS 09/28/2017 5     BASOPHILS 09/28/2017 0     ABS. NEUTROPHILS 09/28/2017 1.4     Abs Lymphocytes 09/28/2017 1.1     ABS. MONOCYTES 09/28/2017 0.3     ABS. EOSINOPHILS 09/28/2017 0.2     ABS. BASOPHILS 09/28/2017 0.0     IMMATURE GRANULOCYTES 09/28/2017 0     ABS. IMM. GRANS. 09/28/2017 0.0     Glucose 09/28/2017 98     BUN 09/28/2017 11     Creatinine 09/28/2017 0.84     GFR est non-AA 09/28/2017 72     GFR est AA 09/28/2017 83     BUN/Creatinine ratio 09/28/2017 13     Sodium 09/28/2017 144     Potassium 09/28/2017 4.5     Chloride 09/28/2017 106     CO2 09/28/2017 22     Calcium 09/28/2017 9.4     Protein, total 09/28/2017 6.4     Albumin 09/28/2017 4.5     GLOBULIN, TOTAL 09/28/2017 1.9     A-G Ratio 09/28/2017 2.4*    Bilirubin, total 09/28/2017 0.3     Alk. phosphatase 09/28/2017 92     AST (SGOT) 09/28/2017 20     ALT (SGPT) 09/28/2017 20     C-Reactive Protein, Qt 09/28/2017 1.7     Sed rate (ESR) 09/28/2017 14        Imaging    Musculoskeletal Ultrasound    None    Radiographs    Bilateral Hand 6/28/2017: RIGHT: No fracture or dislocation on plain film. There are scattered degenerative changes of the interphalangeal joints. No joint space erosion or periosteal reaction. Alignment is within normal limits. Bone mineralization is within normal limits. No soft tissue calcification. LEFT: No fracture or dislocation on plain film. There are scattered degenerative changes of the interphalangeal joints. No joint space erosion or periosteal reaction. Alignment is within normal limits. Bone mineralization is within normal limits. No soft tissue calcification. Bilateral Foot 6/28/2017: RIGHT: No fracture or dislocation on plain film. No joint space narrowing. No joint space erosion or periosteal reaction. Alignment is within normal limits. Bone mineralization is within normal limits. There is minimal calcification at the Achilles insertion. LEFT: No fracture or dislocation on plain film.  There are mild degenerative changes first MTP joint and scattered mild degenerative changes of the midfoot. No joint space erosion or periosteal reaction. Alignment is within normal limits. Bone mineralization is within normal limits. No soft tissue  calcification. CT Imaging    None    MR Imaging    None    DXA     None    ASSESSMENT AND PLAN    This is a follow-up visit for Ms. Jamal Gillette. 1) Seropositive Rheumatoid Arthritis. She was on leflunomide 20 mg monotherapy but she informs me that there was no relief. She has been off for about 2 weeks and has not noticed worsening. Methotrexate was stopped due to nausea. Her CDAI was 12 (previously 11, 1) with 3 tender and 4 swollen joints. I discussed with her about advancing therapy to the biologic (small particle, anti-TNF). We discussed the potential adverse effects, which include infections, and routes of administration (oral versus infusion versus subcutaneous). The patient was informed these medications co-pay are subject to the patient's insurance coverage and we will not know until it has been submitted to the insurance company. She preferred Kaylyn Bores. An order will be submitted today. [employer] form was filled out today. 2) Long Term Use of Immunosuppressants. The patient is now off immunomodulatory medications (leflunomide) and requires frequent toxicity monitoring by blood work. 3) Sjogren's Syndrome. (lacrimal and parotid gland hypertrophy, xerostomia, monoclonal gammopathy). She saw optometry. She has MGUS, followed by Dr. June Mccord. 4) Bilateral Knee Osteoarthritis. This was not an active issue today. 5) Bilateral Hand Osteoarthritis. This was not an active issue today. 6) MGUS. This is due to #1, 2. I referred her to Dr. June Mccord, hematology. 7) Left 2nd Stenosing Tenosynovitis. This is likely from #1. This was not an active issue today. 8) Vitamin D Deficiency. Her vitamin D level was 29.7. She is on weekly ergocalciferol 50,000.     The patient voiced understanding of the aforementioned assessment and plan. Summary of plan was provided in the After Visit Summary patient instructions.      TODAY'S ORDERS    Orders Placed This Encounter    tofacitinib (XELJANZ XR) 11 mg Tb24     Future Appointments  Date Time Provider Alyssa Dilcia   5/8/2018 9:00 AM Roise Kayser, MD [place], [place], [address] [facility]    Adult Rheumatology   Musculoskeletal Ultrasound Certified  [address] [place] [address], [address] [place] [address]   Phone [phone]  Fax [phone]

## 2018-01-10 NOTE — PATIENT INSTRUCTIONS
Tofacitinib (By mouth)   Tofacitinib (toe-fa-SYE-ti-nib)  Treats rheumatoid arthritis. Brand Name(s): Re ALVES   There may be other brand names for this medicine. When This Medicine Should Not Be Used: This medicine is not right for everyone. Do not use it if you had an allergic reaction to tofacitinib. How to Use This Medicine:   Tablet, Long Acting Tablet  · Your doctor will tell you how much medicine to use. Do not use more than directed. · Swallow the extended-release tablet whole. Do not crush, break, or chew it. · If you take the extended-release tablet, part of the tablet may pass into your stools. This is normal and is nothing to worry about. · This medicine should come with a Medication Guide. Ask your pharmacist for a copy if you do not have one. · Missed dose: Take a dose as soon as you remember. If it is almost time for your next dose, wait until then and take a regular dose. Do not take extra medicine to make up for a missed dose. · Store the medicine in a closed container at room temperature, away from heat, moisture, and direct light. Drugs and Foods to Avoid:   Ask your doctor or pharmacist before using any other medicine, including over-the-counter medicines, vitamins, and herbal products. · Some medicines can affect how tofacitinib works. Tell your doctor if you are using any of the following:   ¨ Medicines that weaken your immune system (including azathioprine, cyclosporine, methotrexate, tacrolimus)  ¨ Medicine to treat infection (including fluconazole, ketoconazole, rifampin)  ¨ NSAID medicine (including aspirin, diclofenac, ibuprofen, naproxen)  ¨ Steroid medicine (including dexamethasone, hydrocortisone, methylprednisolone, prednisolone, prednisone)  · This medicine may interfere with vaccines. Ask your doctor before you get a flu shot or any other vaccines. Warnings While Using This Medicine:   · It is not safe to take this medicine during pregnancy.  It could harm an unborn baby. Tell your doctor right away if you become pregnant. Use an effective form of birth control during treatment with this medicine and for at least 4 weeks after your last dose. · Do not breastfeed while you are using this medicine. · Tell your doctor if you have kidney disease, liver disease (including hepatitis B or C), lung disease (including interstitial lung disease), HIV, cancer or a history of cancer, blood problems, diabetes, high cholesterol, stomach or bowel problems (including blockage, diverticulitis, ulcers), or a history of tuberculosis. Also tell your doctor if you have a current infection or an infection that keeps coming back. · This medicine may cause the following problems:  ¨ Increased risk for serious infections, including herpes infection or shingles  ¨ Increased risk for certain cancers, including nonmelanoma skin cancer or lymphoma  ¨ Stomach or bowel perforation (tear or hole)  ¨ High cholesterol in the blood  · Talk with your doctor before using this medicine if you plan to have children. Some women who use this medicine have become infertile (unable to have children). · This medicine may make you bleed, bruise, or get infections more easily. Take precautions to prevent illness and injury. Wash your hands often. · Your doctor will do lab tests at regular visits to check on the effects of this medicine. Keep all appointments. · Keep all medicine out of the reach of children. Never share your medicine with anyone.   Possible Side Effects While Using This Medicine:   Call your doctor right away if you notice any of these side effects:  · Allergic reaction: Itching or hives, swelling in your face or hands, swelling or tingling in your mouth or throat, chest tightness, trouble breathing  · Change in how much or how often you urinate, difficult or painful urination  · Dark urine or pale stools, nausea, vomiting, loss of appetite, stomach pain, yellow skin or eyes  · Fever, chills, cough, difficulty breathing, runny or stuffy nose, sore throat, night sweats, body aches  · Skin or mole changes, sores that do not heal  · Stomach pain, cramping, bloody stools  · Swollen glands in your neck, armpits, or groin  · Unusual bleeding, bruising, or weakness  If you notice other side effects that you think are caused by this medicine, tell your doctor. Call your doctor for medical advice about side effects. You may report side effects to FDA at 9-512-FDA-0688  © 2017 [facility] Information is for End User's use only and may not be sold, redistributed or otherwise used for commercial purposes. The above information is an  only. It is not intended as medical advice for individual conditions or treatments. Talk to your doctor, nurse or pharmacist before following any medical regimen to see if it is safe and effective for you.

## 2018-01-16 ENCOUNTER — TELEPHONE (OUTPATIENT)
Dept: RHEUMATOLOGY | Age: 68
End: 2018-01-16

## 2018-01-16 RX ORDER — LEFLUNOMIDE 20 MG/1
20 TABLET ORAL DAILY
Qty: 90 TAB | Refills: 0 | Status: SHIPPED | OUTPATIENT
Start: 2018-01-16 | End: 2018-04-04 | Stop reason: SDUPTHER

## 2018-01-16 NOTE — TELEPHONE ENCOUNTER
Spoke with pt who called regarding her Leflunomide medication and was asking for a refill. I told her that it looked like he discontinued the leflunomide at her last visit. She stated that he did stop the leflunomide and he sent in a script for Xeljanx. She stated that she would like to continue taking the leflunomide until she receives the [facility]. Dr. Wong Holding the script for her to continue until she get the [facility].

## 2018-01-17 RX ORDER — LEFLUNOMIDE 20 MG/1
TABLET ORAL
Qty: 90 TAB | Refills: 0 | Status: SHIPPED | OUTPATIENT
Start: 2018-01-17 | End: 2018-04-04 | Stop reason: SDUPTHER

## 2018-01-24 ENCOUNTER — TELEPHONE (OUTPATIENT)
Dept: RHEUMATOLOGY | Age: 68
End: 2018-01-24

## 2018-01-25 NOTE — TELEPHONE ENCOUNTER
Faxed prior auth request to [address] to forward to [facility] for Aristeo ALVES for request for insurance approval.

## 2018-02-19 ENCOUNTER — TELEPHONE (OUTPATIENT)
Dept: RHEUMATOLOGY | Age: 68
End: 2018-02-19

## 2018-02-19 NOTE — TELEPHONE ENCOUNTER
Faxed in [employer] Patient Prescription/ Enrollment form, and Patient Application form for Aristeo Parsons.

## 2018-04-04 ENCOUNTER — OFFICE VISIT (OUTPATIENT)
Dept: RHEUMATOLOGY | Age: 68
End: 2018-04-04

## 2018-04-04 VITALS
SYSTOLIC BLOOD PRESSURE: 133 MMHG | RESPIRATION RATE: 18 BRPM | HEART RATE: 87 BPM | HEIGHT: 63 IN | TEMPERATURE: 98 F | DIASTOLIC BLOOD PRESSURE: 85 MMHG | WEIGHT: 158 LBS | BODY MASS INDEX: 28 KG/M2

## 2018-04-04 DIAGNOSIS — M19.042 PRIMARY OSTEOARTHRITIS OF BOTH HANDS: ICD-10-CM

## 2018-04-04 DIAGNOSIS — M17.0 PRIMARY OSTEOARTHRITIS OF BOTH KNEES: ICD-10-CM

## 2018-04-04 DIAGNOSIS — Z79.60 LONG-TERM USE OF IMMUNOSUPPRESSANT MEDICATION: ICD-10-CM

## 2018-04-04 DIAGNOSIS — M19.041 PRIMARY OSTEOARTHRITIS OF BOTH HANDS: ICD-10-CM

## 2018-04-04 DIAGNOSIS — M05.79 SEROPOSITIVE RHEUMATOID ARTHRITIS OF MULTIPLE SITES (HCC): Primary | ICD-10-CM

## 2018-04-04 DIAGNOSIS — E55.9 VITAMIN D DEFICIENCY: ICD-10-CM

## 2018-04-04 DIAGNOSIS — M35.00 SECONDARY SJOGREN'S SYNDROME (HCC): ICD-10-CM

## 2018-04-04 DIAGNOSIS — D47.2 MGUS (MONOCLONAL GAMMOPATHY OF UNKNOWN SIGNIFICANCE): ICD-10-CM

## 2018-04-04 RX ORDER — MULTIVIT WITH MINERALS/HERBS
1 TABLET ORAL DAILY
COMMUNITY
End: 2018-07-09

## 2018-04-04 RX ORDER — LEFLUNOMIDE 20 MG/1
20 TABLET ORAL DAILY
Qty: 90 TAB | Refills: 0 | Status: SHIPPED | OUTPATIENT
Start: 2018-04-04 | End: 2018-07-09 | Stop reason: SDUPTHER

## 2018-04-04 RX ORDER — VITAMIN E 268 MG
CAPSULE ORAL AS NEEDED
COMMUNITY

## 2018-04-04 NOTE — PROGRESS NOTES
REASON FOR VISIT    This is a follow-up visit for Ms. Angle Guaman for Seropositive Rheumatoid Arthritis. Inflammatory arthritis phenotype includes:  Anti-CCP positive: yes (>250)  Rheumatoid factor positive: yes (53.8)  Erosive disease: no  Extra-articular manifestations include: Sjogren's Syndrome    Immunosuppression Screening (6/28/2017): Quantiferon TB: negative  PPD:  Not performed  Hepatitis B: negative  Hepatitis C: negative    Therapy History includes:  Current DMARD therapy includes: leflunomide 20 mg daily  Prior DMARD therapy includes: hydroxychloroquine 400 daily, methotrexate 20 mg every Tuesday, leflunomide 20 mg   The following DMARDs have been ineffective: leflunomide  The following DMARDs were stopped because of side effects: methotrexate (nausea)    Immunizations: There is no immunization history on file for this patient. Active problems include:    Patient Active Problem List   Diagnosis Code    Seropositive rheumatoid arthritis of multiple sites ([facility] M05.79    Long-term use of immunosuppressant medication Z79.899    Primary osteoarthritis of both knees M17.0    Primary osteoarthritis of both hands M19.041, M19.042    Secondary Sjogren's syndrome (HCC) M35.00    Vitamin D deficiency E55.9    MGUS (monoclonal gammopathy of unknown significance) D47.2    Stenosing tenosynovitis of finger M65.30    Leukopenia D72.819       HISTORY OF PRESENT ILLNESS    Ms. Angle Guaman returns for a follow-up. On her last visit, I did not resume leflunomide 20 mg daily due to lack of efficacy, and started [place]. She did not start [place] due to fear of side effects and resume leflunomide 20 mg daily. Today, she feels okay. She endorses aching pain and stiffness that lasts hours in her hands associated with swelling. She feels that her left 2nd PIP swells and then forms a bone spur. She complains of right neck radiculopathy and follows with massage. She declines physical therapy.       Ms. Donna Murray has continued her medications for arthritis and reports good tolerance without significant side effects. Last toxicity monitoring by blood work was done on 9/28/2017 and did not reveal any significant adverse effects, except ALK 55 U/L. Most recent inflammatory markers from 9/28/2017 revealed a ESR 14 mm/hr (previously 2, 9 mm/hr) and CRP 1.7 mg/L (previously 0.7, 0.5 mg/L). The patient has not had any interval hospital admissions, infections, or surgeries, except for hysterectomy. REVIEW OF SYSTEMS    A comprehensive review of systems was performed and pertinent results are documented in the HPI, review of systems is otherwise non-contributory. PAST MEDICAL HISTORY    She has a past medical history of Rheumatoid arthritis ([address] FAMILY HISTORY    Her family history includes Arthritis-rheumatoid in her paternal grandmother; Diabetes in her mother; Heart Attack in her father; Parkinson's Disease in her maternal grandmother. SOCIAL HISTORY    She reports that she has quit smoking. She has never used smokeless tobacco. She reports that she drinks alcohol. She reports that she does not use illicit drugs. MEDICATIONS    Current Outpatient Prescriptions   Medication Sig Dispense Refill    b complex vitamins tablet Take 1 Tab by mouth daily.  vitamin E (AQUA GEMS) 400 unit capsule Take  by mouth daily.  leflunomide (ARAVA) 20 mg tablet Take 1 Tab by mouth daily. 90 Tab 0    folic acid (FOLVITE) 1 mg tablet Take  by mouth daily.  ergocalciferol (ERGOCALCIFEROL) 50,000 unit capsule Take 1 Cap by mouth every seven (7) days. 12 Cap 3    multivitamin (ONE A DAY) tablet Take 1 Tab by mouth daily.           ALLERGIES    Allergies   Allergen Reactions    Nickel Rash       PHYSICAL EXAMINATION    Visit Vitals    /85    Pulse 87    Temp 98 °F (36.7 °C)    Resp 18    Ht 5' 3\" (1.6 m)    Wt 158 lb (71.7 kg)    BMI 27.99 kg/m2     Body mass index is 27.99 kg/(m^2). General: Patient is alert, oriented x 3, not in acute distress    HEENT:   Sclerae are not injected and appear moist.  Oral mucous membranes are moist, there are no ulcers present. Left lacrimal gland enlargement. Sight enlargement of parotids. Moist mucous with good salivary pooling. There is no alopecia. Neck is supple. Cardiovascular:  Heart is regular rate and rhythm, no murmurs. Chest:  Lungs are clear to auscultation bilaterally. No rhonchi, wheezes, or crackles. Extremities:  Free of clubbing, cyanosis, edema    Neurological exam:  No focal sensory deficits, muscle strength is full in upper and lower extremities     Skin exam:  There are no rashes, no alopecia, no discoid lesions, no active Raynaud's, no livedo reticularis, no periungual erythema. Musculoskeletal exam:  A comprehensive musculoskeletal exam was performed for all joints of each upper and lower extremity and assessed for swelling, tenderness and range of motion. Positive results are documented as below:    Bilateral Gabe and Heberden nodes. Bilateral knee crepitus without effusion.   Left 2nd A1 pulley crepitus and tenderness    Joint Count 4/4/2018 1/10/2018 9/28/2017 6/28/2017   Patient pain (0-100) 0.5 10 20 0   MHAQ 0 0 0 0   Left wrist- Tender - 1 - -   Left wrist- Swollen 1 1 - -   Left 3rd MCP - Swollen - 1 1 -   Left 4th MCP - Swollen 1 - - -   Left 5th MCP - Tender 1 - - -   Left 5th MCP - Swollen 1 - 1 -   Left 2nd PIP - Tender 1 1 1 -   Left 2nd PIP - Swollen 1 1 1 -   Left 3rd PIP - Tender 1 - - -   Left 3rd PIP - Swollen 1 - - -   Right 2nd MCP - Swollen - - 1 -   Right 3rd MCP - Swollen - 1 1 -   Right 2nd PIP - Tender 1 - - -   Right 2nd PIP - Swollen 1 - - -   Right 3rd PIP - Tender 1 1 - 0   Right 3rd PIP - Swollen 1 1 1 1   Right 4th PIP - Tender 1 - - -   Right 4th PIP - Swollen 1 - - -   Right 5th PIP - Tender 1 - - -   Right 5th PIP - Swollen 1 - - -   Right knee - Tender 1 - - -   Tender Joint Count (Total) 8 3 1 0   Swollen Joint Count (Total) 9 5 6 1   Physician Assessment (0-10) 2 3 2 0   Patient Assessment (0-10) 0 1 2 0   CDAI Total (calculated) 19 12 11 1       DATA REVIEW    Laboratory     Recent laboratory results were reviewed, summarized, and discussed with the patient. Imaging    Musculoskeletal Ultrasound    None    Radiographs    Bilateral Hand 6/28/2017: RIGHT: No fracture or dislocation on plain film. There are scattered degenerative changes of the interphalangeal joints. No joint space erosion or periosteal reaction. Alignment is within normal limits. Bone mineralization is within normal limits. No soft tissue calcification. LEFT: No fracture or dislocation on plain film. There are scattered degenerative changes of the interphalangeal joints. No joint space erosion or periosteal reaction. Alignment is within normal limits. Bone mineralization is within normal limits. No soft tissue calcification. Bilateral Foot 6/28/2017: RIGHT: No fracture or dislocation on plain film. No joint space narrowing. No joint space erosion or periosteal reaction. Alignment is within normal limits. Bone mineralization is within normal limits. There is minimal calcification at the Achilles insertion. LEFT: No fracture or dislocation on plain film. There are mild degenerative changes first MTP joint and scattered mild degenerative changes of the midfoot. No joint space erosion or periosteal reaction. Alignment is within normal limits. Bone mineralization is within normal limits. No soft tissue  calcification. CT Imaging    None    MR Imaging    None    DXA     None    ASSESSMENT AND PLAN    This is a follow-up visit for Ms. Linda Riojas. 1) Seropositive Rheumatoid Arthritis. She is maintained on leflunomide 20 mg monotherapy because she did not want to start Leocadia Terrence. She feels well. Her CDAI was 19 (previously 12, 11, 1) with 8 tender and 9 swollen joints, consist net with moderate disease activity.  I will continue leflunomide per her request monotherapy. Labs today. 2) Long Term Use of Immunosuppressants. The patient remains on immunomodulatory medications (leflunomide) and requires frequent toxicity monitoring by blood work. Respective labs were ordered (CBC and CMP). 3) Sjogren's Syndrome. (lacrimal and parotid gland hypertrophy, xerostomia, monoclonal gammopathy). She saw optometry. She has MGUS, followed by Dr. Black Phillips. 4) Bilateral Knee Osteoarthritis. This was not an active issue today. 5) Bilateral Hand Osteoarthritis. This was not an active issue today. 6) MGUS. This is due to #1, 2. I referred her to Dr. Black Phillips, hematology. 7) Left 2nd Stenosing Tenosynovitis. This is likely from #1. This was not an active issue today. 8) Vitamin D Deficiency. Her vitamin D level was 29.7. She is on weekly ergocalciferol 50,000. 9) Right Cervical Radiculopathy. She declines physical therapy and prefers seeing her massage therapist.    The patient voiced understanding of the aforementioned assessment and plan. Summary of plan was provided in the After Visit Summary patient instructions.      TODAY'S ORDERS    Orders Placed This Encounter    CBC WITH AUTOMATED DIFF    METABOLIC PANEL, COMPREHENSIVE    C REACTIVE PROTEIN, QT    SED RATE (ESR)    VITAMIN D, 25 HYDROXY    leflunomide (ARAVA) 20 mg tablet     Future Appointments  Date Time Provider Alyssa Ha   5/8/2018 9:00 AM MD Armando Ramírez [id number]   7/9/2018 10:20 AM MD Ryan Marcos MD, [address] [place] [address]    Adult Rheumatology   Musculoskeletal Ultrasound Certified  [address] [place] [address]   Phone [phone]  Fax [phone]

## 2018-04-04 NOTE — MR AVS SNAPSHOT
[address] [place] 
[phone] Patient: Annette Diez MRN: [id number] :1950 Visit Information Date & Time Provider Department Dept. Phone Encounter #  
 2018 10:20 AM Malcolm Partida MD [id number] [phone] Follow-up Instructions Return in about 3 months (around 2018). Your Appointments 2018  9:00 AM  
Follow Up with Jazmin Rutledge MD  
[facility] at [facility]) Appt Note: 6 month f/u- MGUS  
 [address] [address] [phone]  
  
   
 [address] [facility] [id number] Upcoming Health Maintenance Date Due DTaP/Tdap/Td series (1 - Tdap) 10/22/1970 BREAST CANCER SCRN MAMMOGRAM 10/22/1999 FOBT Q 1 YEAR AGE 50-75 10/22/1999 ZOSTER VACCINE AGE 60> 2009 Bone Densitometry (Dexa) Screening 10/22/2014 Pneumococcal 65+ High/Highest Risk (1 of 2 - PCV13) 10/22/2014 Influenza Age 5 to Adult 2017 MEDICARE YEARLY EXAM 3/28/2018 GLAUCOMA SCREENING Q2Y 2019 Allergies as of 2018  Review Complete On: 2018 By: Malcolm Partida MD  
  
 Severity Noted Reaction Type Reactions Nickel  2017    Rash Current Immunizations  Never Reviewed No immunizations on file. Not reviewed this visit You Were Diagnosed With   
  
 Codes Comments Seropositive rheumatoid arthritis of multiple sites [facility])    -  Primary ICD-10-CM: M05.79 ICD-9-CM: 714.0 Long-term use of immunosuppressant medication     ICD-10-CM: Z79.899 ICD-9-CM: V58.69 Secondary Sjogren's syndrome ([facility]     ICD-10-CM: M35.00 ICD-9-CM: 710.2 Primary osteoarthritis of both knees     ICD-10-CM: M17.0 ICD-9-CM: 715.16 Primary osteoarthritis of both hands     ICD-10-CM: M19.041, H57.960 ICD-9-CM: 715.14   
 MGUS (monoclonal gammopathy of unknown significance)     ICD-10-CM: D76.5 ICD-9-CM: 273.1 Vitamin D deficiency     ICD-10-CM: E55.9 ICD-9-CM: 268.9 Vitals BP Pulse Temp Resp Height(growth percentile) Weight(growth percentile) 133/85 87 98 °F (36.7 °C) 18 5' 3\" (1.6 m) 158 lb (71.7 kg) BMI OB Status Smoking Status 27.99 kg/m2 Hysterectomy Former Smoker BMI and BSA Data Body Mass Index Body Surface Area  
 27.99 kg/m 2 1.79 m 2 Preferred Pharmacy Pharmacy Name Phone [address] [facility] [phone] Your Updated Medication List  
  
   
This list is accurate as of 4/4/18 10:31 AM.  Always use your most recent med list.  
  
  
  
  
 b complex vitamins tablet Take 1 Tab by mouth daily. ergocalciferol 50,000 unit capsule Commonly known as:  ERGOCALCIFEROL Take 1 Cap by mouth every seven (7) days. folic acid 1 mg tablet Commonly known as:  [employer] Take  by mouth daily. leflunomide 20 mg tablet Commonly known as:  John Murali Take 1 Tab by mouth daily. multivitamin tablet Commonly known as:  ONE A DAY Take 1 Tab by mouth daily. vitamin E 400 unit capsule Commonly known as:  [address] Take  by mouth daily. XELJANZ XR 11 mg Tb24 Generic drug:  tofacitinib Take  by mouth daily. Prescriptions Sent to Pharmacy Refills  
 leflunomide (ARAVA) 20 mg tablet 0 Sig: Take 1 Tab by mouth daily. Class: Normal  
 Pharmacy: [facility] [address]OSHI [address] [facility] Ph #: [phone] Route: Oral  
  
We Performed the Following C REACTIVE PROTEIN, QT [35018 CPT(R)] CBC WITH AUTOMATED DIFF [09283 CPT(R)] METABOLIC PANEL, COMPREHENSIVE [06059 CPT(R)] SED RATE (ESR) [id number] CPT(R)] VITAMIN D, 25 HYDROXY [id number] CPT(R)] Follow-up Instructions Return in about 3 months (around 7/4/2018). Introducing [facility]! Filipe Nath introduces [employer] patient portal. Now you can access parts of your medical record, email your doctor's office, and request medication refills online. 1. In your internet browser, go to [email or web address] 2. Click on the First Time User? Click Here link in the Sign In box. You will see the New Member Sign Up page. 3. Enter your [employer] Access Code exactly as it appears below. You will not need to use this code after youve completed the sign-up process. If you do not sign up before the expiration date, you must request a new code. · [employer] Access Code: [email or web address] Expires: 4/10/2018  4:38 PM 
 
4. Enter the last four digits of your Social Security Number (xxxx) and Date of Birth (mm/dd/yyyy) as indicated and click Submit. You will be taken to the next sign-up page. 5. Create a [employer]Vet ID. This will be your [employer]et login ID and cannot be changed, so think of one that is secure and easy to remember. 6. Create a [employer] password. You can change your password at any time. 7. Enter your Password Reset Question and Answer. This can be used at a later time if you forget your password. 8. Enter your e-mail address. You will receive e-mail notification when new information is available in [address] 9. Click Sign Up. You can now view and download portions of your medical record. 10. Click the Download Summary menu link to download a portable copy of your medical information. If you have questions, please visit the Frequently Asked Questions section of the [employer] website. Remember, [employer] is NOT to be used for urgent needs. For medical emergencies, dial 911. Now available from your iPhone and Android! Please provide this summary of care documentation to your next provider. Your primary care clinician is listed as Isela De Anda.  If you have any questions after today's visit, please call [phone].

## 2018-04-05 LAB
25(OH)D3+25(OH)D2 SERPL-MCNC: 76.3 NG/ML (ref 30–100)
ALBUMIN SERPL-MCNC: 4.8 G/DL (ref 3.6–4.8)
ALBUMIN/GLOB SERPL: 2.3 {RATIO} (ref 1.2–2.2)
ALP SERPL-CCNC: 96 IU/L (ref 39–117)
ALT SERPL-CCNC: 31 IU/L (ref 0–32)
AST SERPL-CCNC: 26 IU/L (ref 0–40)
BASOPHILS # BLD AUTO: 0 X10E3/UL (ref 0–0.2)
BASOPHILS NFR BLD AUTO: 0 %
BILIRUB SERPL-MCNC: 0.2 MG/DL (ref 0–1.2)
BUN SERPL-MCNC: 11 MG/DL (ref 8–27)
BUN/CREAT SERPL: 13 (ref 12–28)
CALCIUM SERPL-MCNC: 9.9 MG/DL (ref 8.7–10.3)
CHLORIDE SERPL-SCNC: 104 MMOL/L (ref 96–106)
CO2 SERPL-SCNC: 21 MMOL/L (ref 18–29)
CREAT SERPL-MCNC: 0.88 MG/DL (ref 0.57–1)
CRP SERPL-MCNC: 0.6 MG/L (ref 0–4.9)
EOSINOPHIL # BLD AUTO: 0.1 X10E3/UL (ref 0–0.4)
EOSINOPHIL NFR BLD AUTO: 3 %
ERYTHROCYTE [DISTWIDTH] IN BLOOD BY AUTOMATED COUNT: 15.3 % (ref 12.3–15.4)
ERYTHROCYTE [SEDIMENTATION RATE] IN BLOOD BY WESTERGREN METHOD: 12 MM/HR (ref 0–40)
GFR SERPLBLD CREATININE-BSD FMLA CKD-EPI: 68 ML/MIN/1.73
GFR SERPLBLD CREATININE-BSD FMLA CKD-EPI: 78 ML/MIN/1.73
GLOBULIN SER CALC-MCNC: 2.1 G/DL (ref 1.5–4.5)
GLUCOSE SERPL-MCNC: 97 MG/DL (ref 65–99)
HCT VFR BLD AUTO: 39.1 % (ref 34–46.6)
HGB BLD-MCNC: 12.7 G/DL (ref 11.1–15.9)
IMM GRANULOCYTES # BLD: 0 X10E3/UL (ref 0–0.1)
IMM GRANULOCYTES NFR BLD: 0 %
LYMPHOCYTES # BLD AUTO: 1.2 X10E3/UL (ref 0.7–3.1)
LYMPHOCYTES NFR BLD AUTO: 40 %
MCH RBC QN AUTO: 28.1 PG (ref 26.6–33)
MCHC RBC AUTO-ENTMCNC: 32.5 G/DL (ref 31.5–35.7)
MCV RBC AUTO: 87 FL (ref 79–97)
MONOCYTES # BLD AUTO: 0.3 X10E3/UL (ref 0.1–0.9)
MONOCYTES NFR BLD AUTO: 11 %
NEUTROPHILS # BLD AUTO: 1.4 X10E3/UL (ref 1.4–7)
NEUTROPHILS NFR BLD AUTO: 46 %
PLATELET # BLD AUTO: 268 X10E3/UL (ref 150–379)
POTASSIUM SERPL-SCNC: 4.6 MMOL/L (ref 3.5–5.2)
PROT SERPL-MCNC: 6.9 G/DL (ref 6–8.5)
RBC # BLD AUTO: 4.52 X10E6/UL (ref 3.77–5.28)
SODIUM SERPL-SCNC: 146 MMOL/L (ref 134–144)
WBC # BLD AUTO: 3 X10E3/UL (ref 3.4–10.8)

## 2018-04-05 NOTE — PROGRESS NOTES
The results were reviewed and a letter was sent. Chronic low WBC. Vitamin D elevated, change vitamin D to every 2 weeks.  Remaining labs are normal.

## 2018-05-08 DIAGNOSIS — D47.2 MGUS (MONOCLONAL GAMMOPATHY OF UNKNOWN SIGNIFICANCE): ICD-10-CM

## 2018-07-09 ENCOUNTER — OFFICE VISIT (OUTPATIENT)
Dept: RHEUMATOLOGY | Age: 68
End: 2018-07-09

## 2018-07-09 VITALS
HEART RATE: 87 BPM | TEMPERATURE: 98.3 F | RESPIRATION RATE: 18 BRPM | HEIGHT: 63 IN | OXYGEN SATURATION: 96 % | DIASTOLIC BLOOD PRESSURE: 82 MMHG | SYSTOLIC BLOOD PRESSURE: 119 MMHG

## 2018-07-09 DIAGNOSIS — M35.00 SECONDARY SJOGREN'S SYNDROME (HCC): ICD-10-CM

## 2018-07-09 DIAGNOSIS — Z79.60 LONG-TERM USE OF IMMUNOSUPPRESSANT MEDICATION: ICD-10-CM

## 2018-07-09 DIAGNOSIS — E55.9 VITAMIN D DEFICIENCY: ICD-10-CM

## 2018-07-09 DIAGNOSIS — M05.79 SEROPOSITIVE RHEUMATOID ARTHRITIS OF MULTIPLE SITES (HCC): Primary | ICD-10-CM

## 2018-07-09 RX ORDER — LEFLUNOMIDE 20 MG/1
20 TABLET ORAL DAILY
Qty: 90 TAB | Refills: 0 | Status: SHIPPED | OUTPATIENT
Start: 2018-07-09 | End: 2018-10-15 | Stop reason: SDUPTHER

## 2018-07-09 RX ORDER — ERGOCALCIFEROL 1.25 MG/1
50000 CAPSULE ORAL
Qty: 6 CAP | Refills: 3 | Status: SHIPPED | OUTPATIENT
Start: 2018-07-09 | End: 2019-11-04 | Stop reason: SDUPTHER

## 2018-07-09 NOTE — PROGRESS NOTES
REASON FOR VISIT    This is a follow-up visit for Ms. Tess Stanley for Seropositive Rheumatoid Arthritis. Inflammatory arthritis phenotype includes:  Anti-CCP positive: yes (>250)  Rheumatoid factor positive: yes (53.8)  Erosive disease: no  Extra-articular manifestations include: Sjogren's Syndrome    Immunosuppression Screening (6/28/2017): Quantiferon TB: negative  PPD:  Not performed  Hepatitis B: negative  Hepatitis C: negative    Therapy History includes:  Current DMARD therapy includes: leflunomide 20 mg daily  Prior DMARD therapy includes: hydroxychloroquine 400 daily, methotrexate 20 mg every Tuesday, leflunomide 20 mg   The following DMARDs have been ineffective: leflunomide  The following DMARDs were stopped because of side effects: methotrexate (nausea)    Immunizations: There is no immunization history on file for this patient. Active problems include:    Patient Active Problem List   Diagnosis Code    Seropositive rheumatoid arthritis of multiple sites ([facility] M05.79    Long-term use of immunosuppressant medication Z79.899    Primary osteoarthritis of both knees M17.0    Primary osteoarthritis of both hands M19.041, M19.042    Secondary Sjogren's syndrome (HCC) M35.00    Vitamin D deficiency E55.9    MGUS (monoclonal gammopathy of unknown significance) D47.2    Stenosing tenosynovitis of finger M65.30    Leukopenia D72.819       HISTORY OF PRESENT ILLNESS    Ms. Tess Stanley returns for a follow-up. On her last visit, I continued monotherapy leflunomide 20 mg daily due to her preference of not starting Emmit Perfect due to fear of side effects and resume leflunomide 20 mg daily. Today, she feels okay. She endorses aching sore pain in her hands without swelling or stiffness. Ms. Tess Stanley has continued her medications for arthritis and reports good tolerance without significant side effects.      Last toxicity monitoring by blood work was done on 4/04/2018 and did not reveal any significant adverse effects, except WBC 3.0. Most recent inflammatory markers from 4/04/2018 revealed a ESR 12 mm/hr (previously 14, 2, 9 mm/hr) and CRP 0.6 mg/L (previously 1.7, 0.7, 0.5 mg/L). The patient has not had any interval hospital admissions, infections, or surgeries. REVIEW OF SYSTEMS    A comprehensive review of systems was performed and pertinent results are documented in the HPI, review of systems is otherwise non-contributory. PAST MEDICAL HISTORY    She has a past medical history of Rheumatoid arthritis ([address] FAMILY HISTORY    Her family history includes Arthritis-rheumatoid in her paternal grandmother; Diabetes in her mother; Heart Attack in her father; Parkinson's Disease in her maternal grandmother. SOCIAL HISTORY    She reports that she has quit smoking. She has never used smokeless tobacco. She reports that she drinks alcohol. She reports that she does not use illicit drugs. MEDICATIONS    Current Outpatient Prescriptions   Medication Sig Dispense Refill    leflunomide (ARAVA) 20 mg tablet Take 1 Tab by mouth daily. 90 Tab 0    ergocalciferol (ERGOCALCIFEROL) 50,000 unit capsule Take 1 Cap by mouth every fourteen (14) days. 6 Cap 3    vitamin E (AQUA GEMS) 400 unit capsule Take  by mouth daily. ALLERGIES    Allergies   Allergen Reactions    Nickel Rash       PHYSICAL EXAMINATION    Visit Vitals    /82 (BP 1 Location: Left arm, BP Patient Position: Sitting)    Pulse 87    Temp 98.3 °F (36.8 °C) (Oral)    Resp 18    Ht 5' 3\" (1.6 m)    SpO2 96%     There is no height or weight on file to calculate BMI. General: Patient is alert, oriented x 3, not in acute distress    HEENT:   Sclerae are not injected and appear moist.  Oral mucous membranes are moist, there are no ulcers present. Left lacrimal gland enlargement. Sight enlargement of parotids. Moist mucous with good salivary pooling. There is no alopecia. Neck is supple.     Cardiovascular:  Heart is regular rate and rhythm, no murmurs. Chest:  Lungs are clear to auscultation bilaterally. No rhonchi, wheezes, or crackles. Extremities:  Free of clubbing, cyanosis, edema    Neurological exam:  No focal sensory deficits, muscle strength is full in upper and lower extremities     Skin exam:  There are no rashes, no alopecia, no discoid lesions, no active Raynaud's, no livedo reticularis, no periungual erythema. Musculoskeletal exam:  A comprehensive musculoskeletal exam was performed for all joints of each upper and lower extremity and assessed for swelling, tenderness and range of motion. Positive results are documented as below:    Bilateral Gabe and Heberden nodes. Bilateral knee crepitus without effusion.   Left 2nd A1 pulley crepitus without tenderness    Joint Count 7/9/2018 4/4/2018 1/10/2018 9/28/2017 6/28/2017   Patient pain (0-100) 5 0.5 10 20 0   MHAQ 0 0 0 0 0   Left wrist- Tender - - 1 - -   Left wrist- Swollen - 1 1 - -   Left 2nd MCP - Swollen 1 - - - -   Left 3rd MCP - Swollen - - 1 1 -   Left 4th MCP - Swollen - 1 - - -   Left 5th MCP - Tender - 1 - - -   Left 5th MCP - Swollen 1 1 - 1 -   Left 2nd PIP - Tender 1 1 1 1 -   Left 2nd PIP - Swollen 1 1 1 1 -   Left 3rd PIP - Tender 1 1 - - -   Left 3rd PIP - Swollen 1 1 - - -   Left 4th PIP - Swollen 1 - - - -   Right 2nd MCP - Swollen - - - 1 -   Right 3rd MCP - Swollen - - 1 1 -   Right 2nd PIP - Tender - 1 - - -   Right 2nd PIP - Swollen 1 1 - - -   Right 3rd PIP - Tender 1 1 1 - 0   Right 3rd PIP - Swollen 1 1 1 1 1   Right 4th PIP - Tender - 1 - - -   Right 4th PIP - Swollen 1 1 - - -   Right 5th PIP - Tender 1 1 - - -   Right 5th PIP - Swollen 1 1 - - -   Right knee - Tender - 1 - - -   Tender Joint Count (Total) 4 8 3 1 0   Swollen Joint Count (Total) 9 9 5 6 1   Physician Assessment (0-10) 3 2 3 2 0   Patient Assessment (0-10) 0.5 0 1 2 0   CDAI Total (calculated) 16.5 19 12 11 1       DATA REVIEW    Laboratory     Recent laboratory results were reviewed, summarized, and discussed with the patient. Imaging    Musculoskeletal Ultrasound    None    Radiographs    Bilateral Hand 6/28/2017: RIGHT: No fracture or dislocation on plain film. There are scattered degenerative changes of the interphalangeal joints. No joint space erosion or periosteal reaction. Alignment is within normal limits. Bone mineralization is within normal limits. No soft tissue calcification. LEFT: No fracture or dislocation on plain film. There are scattered degenerative changes of the interphalangeal joints. No joint space erosion or periosteal reaction. Alignment is within normal limits. Bone mineralization is within normal limits. No soft tissue calcification. Bilateral Foot 6/28/2017: RIGHT: No fracture or dislocation on plain film. No joint space narrowing. No joint space erosion or periosteal reaction. Alignment is within normal limits. Bone mineralization is within normal limits. There is minimal calcification at the Achilles insertion. LEFT: No fracture or dislocation on plain film. There are mild degenerative changes first MTP joint and scattered mild degenerative changes of the midfoot. No joint space erosion or periosteal reaction. Alignment is within normal limits. Bone mineralization is within normal limits. No soft tissue  calcification. CT Imaging    None    MR Imaging    None    DXA     None    ASSESSMENT AND PLAN    This is a follow-up visit for Ms. Payton Car. 1) Seropositive Rheumatoid Arthritis. She is maintained on leflunomide 20 mg monotherapy because she did not want to start Roxann Rosie. She feels well. Her CDAI was 16.5 (previously 19, 12, 11, 1) with 4 tender and 9 swollen joints, consistent with moderate disease activity. She did have mild improvement and I reducated her about the importance of medication augmentation but she wants to reassess in 3 months. I will continue leflunomide per her request monotherapy. Labs today.     2) Long Term Use of Immunosuppressants. The patient remains on immunomodulatory medications (leflunomide) and requires frequent toxicity monitoring by blood work. Respective labs were ordered (CBC and CMP). 3) Sjogren's Syndrome. (lacrimal and parotid gland hypertrophy, xerostomia, monoclonal gammopathy). She saw optometry. 4) Bilateral Knee Osteoarthritis. This was not an active issue today. 5) Bilateral Hand Osteoarthritis. This was not an active issue today. 6) MGUS. This is due to #1, 2. She follows with Dr. Darline Nixon, hematology. 7) Left 2nd Stenosing Tenosynovitis. This is likely from #1. This was not an active issue today. 8) Vitamin D Deficiency. Her vitamin D level was 79.3 (previously 29.7). She was changed to every 14 days ergocalciferol 50,000. 9) Right Cervical Radiculopathy. She declines physical therapy and prefers seeing her massage therapist.    The patient voiced understanding of the aforementioned assessment and plan. Summary of plan was provided in the After Visit Summary patient instructions.      TODAY'S ORDERS    Orders Placed This Encounter    CBC WITH AUTOMATED DIFF    METABOLIC PANEL, COMPREHENSIVE    C REACTIVE PROTEIN, QT    SED RATE (ESR)    leflunomide (ARAVA) 20 mg tablet    ergocalciferol (ERGOCALCIFEROL) 50,000 unit capsule     Future Appointments  Date Time Provider Alyssa Ha   10/15/2018 10:40 AM MD Ryan Lamb MD, [address] [facility]    Adult Rheumatology   Musculoskeletal Ultrasound Certified  [address]   Phone [phone]  Fax [phone]

## 2018-07-09 NOTE — MR AVS SNAPSHOT
[address] 
[phone] Patient: Arsenio Mota MRN: [id number] :1950 Visit Information Date & Time Provider Department Dept. Phone Encounter #  
 2018 10:20 AM Josh Pablo  [facility] [id number] Follow-up Instructions Return in about 3 months (around 10/9/2018). Upcoming Health Maintenance Date Due DTaP/Tdap/Td series (1 - Tdap) 10/22/1970 BREAST CANCER SCRN MAMMOGRAM 10/22/1999 FOBT Q 1 YEAR AGE 50-75 10/22/1999 ZOSTER VACCINE AGE 60> 2009 Bone Densitometry (Dexa) Screening 10/22/2014 Pneumococcal 65+ High/Highest Risk (1 of 2 - PCV13) 10/22/2014 Influenza Age 5 to Adult 2018 GLAUCOMA SCREENING Q2Y 2019 Allergies as of 2018  Review Complete On: 2018 By: Josh Pablo MD  
  
 Severity Noted Reaction Type Reactions Nickel  2017    Rash Current Immunizations  Never Reviewed No immunizations on file. Not reviewed this visit You Were Diagnosed With   
  
 Codes Comments Seropositive rheumatoid arthritis of multiple sites [facility])    -  Primary ICD-10-CM: M05.79 ICD-9-CM: 714.0 Long-term use of immunosuppressant medication     ICD-10-CM: Z79.899 ICD-9-CM: V58.69 Vitals BP Pulse Temp Resp Height(growth percentile) SpO2  
 119/82 (BP 1 Location: Left arm, BP Patient Position: Sitting) 87 98.3 °F (36.8 °C) (Oral) 18 5' 3\" (1.6 m) 96% OB Status Smoking Status Hysterectomy Former Smoker Vitals History Preferred Pharmacy Pharmacy Name Phone [facility] [phone] Your Updated Medication List  
  
   
This list is accurate as of 18 10:47 AM.  Always use your most recent med list.  
  
  
  
  
 ergocalciferol 50,000 unit capsule Commonly known as:  ERGOCALCIFEROL Take 1 Cap by mouth every seven (7) days. leflunomide 20 mg tablet Commonly known as:  Avelino Feeler Take 1 Tab by mouth daily. vitamin E 400 unit capsule Commonly known as:  [address] Take  by mouth daily. Prescriptions Sent to Pharmacy Refills  
 leflunomide (ARAVA) 20 mg tablet 0 Sig: Take 1 Tab by mouth daily. Class: Normal  
 Pharmacy: [facility] [address] ZEINAB JOSHI [address] [facility] #: [phone] Route: Oral  
  
We Performed the Following C REACTIVE PROTEIN, QT [90603 CPT(R)] CBC WITH AUTOMATED DIFF [52691 CPT(R)] METABOLIC PANEL, COMPREHENSIVE [91217 CPT(R)] SED RATE (ESR) [id number] CPT(R)] Follow-up Instructions Return in about 3 months (around 10/9/2018). Introducing [facility] & HEALTH SERVICES! Reji Jiang introduces [employer] patient portal. Now you can access parts of your medical record, email your doctor's office, and request medication refills online. 1. In your internet browser, go to [email or web address] 2. Click on the First Time User? Click Here link in the Sign In box. You will see the New Member Sign Up page. 3. Enter your [employer] Access Code exactly as it appears below. You will not need to use this code after youve completed the sign-up process. If you do not sign up before the expiration date, you must request a new code. · [employer] Access Code: [email or web address] Expires: 10/7/2018 10:46 AM 
 
4. Enter the last four digits of your Social Security Number (xxxx) and Date of Birth (mm/dd/yyyy) as indicated and click Submit. You will be taken to the next sign-up page. 5. Create a [employer] ID. This will be your [employer] login ID and cannot be changed, so think of one that is secure and easy to remember. 6. Create a [employer] password. You can change your password at any time. 7. Enter your Password Reset Question and Answer.  This can be used at a later time if you forget your password. 8. Enter your e-mail address. You will receive e-mail notification when new information is available in [address] 9. Click Sign Up. You can now view and download portions of your medical record. 10. Click the Download Summary menu link to download a portable copy of your medical information. If you have questions, please visit the Frequently Asked Questions section of the [employer] website. Remember, [employer] is NOT to be used for urgent needs. For medical emergencies, dial 911. Now available from your iPhone and Android! Please provide this summary of care documentation to your next provider. Your primary care clinician is listed as Omayra Cruz. If you have any questions after today's visit, please call [phone].

## 2018-07-10 LAB
ALBUMIN SERPL-MCNC: 4.4 G/DL (ref 3.6–4.8)
ALBUMIN/GLOB SERPL: 2.2 {RATIO} (ref 1.2–2.2)
ALP SERPL-CCNC: 102 IU/L (ref 39–117)
ALT SERPL-CCNC: 31 IU/L (ref 0–32)
AST SERPL-CCNC: 24 IU/L (ref 0–40)
BASOPHILS # BLD AUTO: 0 X10E3/UL (ref 0–0.2)
BASOPHILS NFR BLD AUTO: 0 %
BILIRUB SERPL-MCNC: <0.2 MG/DL (ref 0–1.2)
BUN SERPL-MCNC: 9 MG/DL (ref 8–27)
BUN/CREAT SERPL: 12 (ref 12–28)
CALCIUM SERPL-MCNC: 9.2 MG/DL (ref 8.7–10.3)
CHLORIDE SERPL-SCNC: 104 MMOL/L (ref 96–106)
CO2 SERPL-SCNC: 19 MMOL/L (ref 20–29)
CREAT SERPL-MCNC: 0.75 MG/DL (ref 0.57–1)
CRP SERPL-MCNC: 1 MG/L (ref 0–4.9)
EOSINOPHIL # BLD AUTO: 0.1 X10E3/UL (ref 0–0.4)
EOSINOPHIL NFR BLD AUTO: 2 %
ERYTHROCYTE [DISTWIDTH] IN BLOOD BY AUTOMATED COUNT: 15.5 % (ref 12.3–15.4)
ERYTHROCYTE [SEDIMENTATION RATE] IN BLOOD BY WESTERGREN METHOD: 17 MM/HR (ref 0–40)
GLOBULIN SER CALC-MCNC: 2 G/DL (ref 1.5–4.5)
GLUCOSE SERPL-MCNC: 81 MG/DL (ref 65–99)
HCT VFR BLD AUTO: 37.7 % (ref 34–46.6)
HGB BLD-MCNC: 12.4 G/DL (ref 11.1–15.9)
IMM GRANULOCYTES # BLD: 0 X10E3/UL (ref 0–0.1)
IMM GRANULOCYTES NFR BLD: 0 %
LYMPHOCYTES # BLD AUTO: 1.3 X10E3/UL (ref 0.7–3.1)
LYMPHOCYTES NFR BLD AUTO: 43 %
MCH RBC QN AUTO: 27.7 PG (ref 26.6–33)
MCHC RBC AUTO-ENTMCNC: 32.9 G/DL (ref 31.5–35.7)
MCV RBC AUTO: 84 FL (ref 79–97)
MONOCYTES # BLD AUTO: 0.3 X10E3/UL (ref 0.1–0.9)
MONOCYTES NFR BLD AUTO: 9 %
NEUTROPHILS # BLD AUTO: 1.4 X10E3/UL (ref 1.4–7)
NEUTROPHILS NFR BLD AUTO: 46 %
PLATELET # BLD AUTO: 296 X10E3/UL (ref 150–379)
POTASSIUM SERPL-SCNC: 4.4 MMOL/L (ref 3.5–5.2)
PROT SERPL-MCNC: 6.4 G/DL (ref 6–8.5)
RBC # BLD AUTO: 4.47 X10E6/UL (ref 3.77–5.28)
SODIUM SERPL-SCNC: 141 MMOL/L (ref 134–144)
WBC # BLD AUTO: 3.1 X10E3/UL (ref 3.4–10.8)

## 2018-07-10 NOTE — PROGRESS NOTES
The results were reviewed and a letter was sent. Stable low white blood cell (WBC). Remaining labs look good.

## 2018-10-15 ENCOUNTER — OFFICE VISIT (OUTPATIENT)
Dept: RHEUMATOLOGY | Age: 68
End: 2018-10-15

## 2018-10-15 VITALS
RESPIRATION RATE: 18 BRPM | HEIGHT: 63 IN | BODY MASS INDEX: 27.64 KG/M2 | SYSTOLIC BLOOD PRESSURE: 136 MMHG | DIASTOLIC BLOOD PRESSURE: 84 MMHG | WEIGHT: 156 LBS | HEART RATE: 79 BPM | TEMPERATURE: 97.5 F

## 2018-10-15 DIAGNOSIS — M35.00 SECONDARY SJOGREN'S SYNDROME (HCC): ICD-10-CM

## 2018-10-15 DIAGNOSIS — E55.9 VITAMIN D DEFICIENCY: ICD-10-CM

## 2018-10-15 DIAGNOSIS — M05.79 SEROPOSITIVE RHEUMATOID ARTHRITIS OF MULTIPLE SITES (HCC): Primary | ICD-10-CM

## 2018-10-15 DIAGNOSIS — Z79.60 LONG-TERM USE OF IMMUNOSUPPRESSANT MEDICATION: ICD-10-CM

## 2018-10-15 DIAGNOSIS — D47.2 MGUS (MONOCLONAL GAMMOPATHY OF UNKNOWN SIGNIFICANCE): ICD-10-CM

## 2018-10-15 DIAGNOSIS — M17.0 PRIMARY OSTEOARTHRITIS OF BOTH KNEES: ICD-10-CM

## 2018-10-15 RX ORDER — MULTIVIT WITH MINERALS/HERBS
1 TABLET ORAL DAILY
COMMUNITY
End: 2019-11-04

## 2018-10-15 RX ORDER — LANOLIN ALCOHOL/MO/W.PET/CERES
1 CREAM (GRAM) TOPICAL 2 TIMES DAILY
COMMUNITY
End: 2019-04-18

## 2018-10-15 RX ORDER — LEFLUNOMIDE 20 MG/1
20 TABLET ORAL DAILY
Qty: 90 TAB | Refills: 0 | Status: SHIPPED | OUTPATIENT
Start: 2018-10-15 | End: 2019-04-05 | Stop reason: SDUPTHER

## 2018-10-15 NOTE — LETTER
10/22/2018 10:08 AM 
 
Ms. Cordova Necessary [address] Dear Tasha Necessary: Please find your most recent results below. Stable low WBC. slighl low neutrophils (1.0). Positive HBcIgM, this is likely a false positive due to cross-reactivity. Stable MGUS with M-spike 0.3 with stable slightly low IgG and IgM. Stable overall. Resulted Orders CBC WITH AUTOMATED DIFF Result Value Ref Range WBC 2.6 (L) 3.4 - 10.8 x10E3/uL  
 RBC 4.67 3.77 - 5.28 x10E6/uL HGB 13.1 11.1 - 15.9 g/dL HCT 41.0 34.0 - 46.6 % MCV 88 79 - 97 fL  
 MCH 28.1 26.6 - 33.0 pg  
 MCHC 32.0 31.5 - 35.7 g/dL  
 RDW 15.2 12.3 - 15.4 % PLATELET 371 205 - 145 x10E3/uL NEUTROPHILS 40 Not Estab. % Lymphocytes 47 Not Estab. % MONOCYTES 11 Not Estab. % EOSINOPHILS 2 Not Estab. % BASOPHILS 0 Not Estab. %  
 ABS. NEUTROPHILS 1.0 (L) 1.4 - 7.0 x10E3/uL Abs Lymphocytes 1.2 0.7 - 3.1 x10E3/uL  
 ABS. MONOCYTES 0.3 0.1 - 0.9 x10E3/uL  
 ABS. EOSINOPHILS 0.1 0.0 - 0.4 x10E3/uL  
 ABS. BASOPHILS 0.0 0.0 - 0.2 x10E3/uL IMMATURE GRANULOCYTES 0 Not Estab. %  
 ABS. IMM. GRANS. 0.0 0.0 - 0.1 x10E3/uL Hematology comments: Note: METABOLIC PANEL, COMPREHENSIVE Result Value Ref Range Glucose 82 65 - 99 mg/dL BUN 7 (L) 8 - 27 mg/dL Creatinine 0.70 0.57 - 1.00 mg/dL GFR est non-AA 89 >59 mL/min/1.73 GFR est  >59 mL/min/1.73  
 BUN/Creatinine ratio 10 (L) 12 - 28 Sodium 141 134 - 144 mmol/L Potassium 4.9 3.5 - 5.2 mmol/L Chloride 102 96 - 106 mmol/L  
 CO2 23 20 - 29 mmol/L Calcium 9.6 8.7 - 10.3 mg/dL Protein, total 6.7 6.0 - 8.5 g/dL Albumin 4.5 3.6 - 4.8 g/dL GLOBULIN, TOTAL 2.2 1.5 - 4.5 g/dL A-G Ratio 2.0 1.2 - 2.2 Bilirubin, total 0.2 0.0 - 1.2 mg/dL Alk. phosphatase 91 39 - 117 IU/L  
 AST (SGOT) 24 0 - 40 IU/L  
 ALT (SGPT) 28 0 - 32 IU/L  
SED RATE (ESR) Result Value Ref Range Sed rate (ESR) 10 0 - 40 mm/hr VITAMIN D, 25 HYDROXY Result Value Ref Range VITAMIN D, 25-HYDROXY 48.0 30.0 - 100.0 ng/mL CHRONIC HEPATITIS PANEL Result Value Ref Range Hep B surface Ag screen Negative Negative Hepatitis Be Antigen Negative Negative Hep B Core Ab, IgM Positive (A) Negative Hep B Core Ab, total Negative Negative Hepatitis Be Antibody Negative Negative HEP B SURFACE AB, QUAL Non Reactive HCV Ab <0.1 0.0 - 0.9 s/co ratio PROTEIN ELECTROPHORESIS W/ REFLX CHASE Result Value Ref Range Albumin 3.8 2.9 - 4.4 g/dL Alpha-1-globulin 0.3 0.0 - 0.4 g/dL ALPHA-2 GLOBULIN 0.8 0.4 - 1.0 g/dL Beta globulin 1.2 0.7 - 1.3 g/dL Gamma globulin 0.7 0.4 - 1.8 g/dL M-Nash 0.3 (H) Not Observed g/dL Globulin, total 2.9 2.2 - 3.9 g/dL A/G ratio 1.3 0.7 - 1.7 Please note Comment Interpretation (see below) . IMMUNOFIXATION Result Value Ref Range Immunoglobulin G, Qt. 513 (L) 700 - 1,600 mg/dL Immunoglobulin A, Qt. 144 87 - 352 mg/dL Immunoglobulin M, Qt. 25 (L) 26 - 217 mg/dL Immunofixation Result Comment COMMENT Result Value Ref Range Comment Comment Please call me if you have any questions: [phone] Sincerely, 
Amara Haas MD

## 2018-10-15 NOTE — MR AVS SNAPSHOT
[address] [id number] [phone] Patient: Pablo Sanchez MRN: [id number] :1950 Visit Information Date & Time Provider Department Dept. Phone Encounter #  
 10/15/2018 10:40 AM Claudia Collins Anatoliy Veea [phone] [id number] Follow-up Instructions Return in about 6 months (around 4/15/2019). Upcoming Health Maintenance Date Due DTaP/Tdap/Td series (1 - Tdap) 10/22/1970 Shingrix Vaccine Age 50> (1 of 2) 10/22/1999 BREAST CANCER SCRN MAMMOGRAM 10/22/1999 FOBT Q 1 YEAR AGE 50-75 10/22/1999 Bone Densitometry (Dexa) Screening 10/22/2014 Pneumococcal 65+ High/Highest Risk (1 of 2 - PCV13) 10/22/2014 Influenza Age 5 to Adult 2018 MEDICARE YEARLY EXAM 10/15/2018 GLAUCOMA SCREENING Q2Y 2019 Allergies as of 10/15/2018  Review Complete On: 10/15/2018 By: Claudia Collins MD  
  
 Severity Noted Reaction Type Reactions Nickel  2017    Rash Current Immunizations  Never Reviewed No immunizations on file. Not reviewed this visit You Were Diagnosed With   
  
 Codes Comments Seropositive rheumatoid arthritis of multiple sites [facility])    -  Primary ICD-10-CM: M05.79 ICD-9-CM: 714.0 Long-term use of immunosuppressant medication     ICD-10-CM: Z79.899 ICD-9-CM: V58.69 Secondary Sjogren's syndrome ([facility] [address]     ICD-10-CM: M35.00 ICD-9-CM: 710.2 Primary osteoarthritis of both knees     ICD-10-CM: M17.0 ICD-9-CM: 715.16   
 MGUS (monoclonal gammopathy of unknown significance)     ICD-10-CM: Y00.4 ICD-9-CM: 273.1 Vitamin D deficiency     ICD-10-CM: E55.9 ICD-9-CM: 268.9 Vitals BP Pulse Temp Resp Height(growth percentile) Weight(growth percentile) 136/84 79 97.5 °F (36.4 °C) 18 5' 3\" (1.6 m) 156 lb (70.8 kg) BMI OB Status Smoking Status 27.63 kg/m2 Hysterectomy Former Smoker BMI and BSA Data Body Mass Index Body Surface Area  
 27.63 kg/m 2 1.77 m 2 Preferred Pharmacy Pharmacy Name Phone [address] [facility] [phone] Your Updated Medication List  
  
   
This list is accurate as of 10/15/18 11:15 AM.  Always use your most recent med list.  
  
  
  
  
 b complex vitamins tablet Take 1 Tab by mouth daily. ergocalciferol 50,000 unit capsule Commonly known as:  ERGOCALCIFEROL Take 1 Cap by mouth every fourteen (14) days. glucosamine-chondroitin 500-400 mg Cap Commonly known as:  [facility] Take 1 Cap by mouth two (2) times a day. leflunomide 20 mg tablet Commonly known as:  Beti Hanks Take 1 Tab by mouth daily. TURMERIC PO Take  by mouth two (2) times a day. vitamin E 400 unit capsule Commonly known as:  [address] Take  by mouth daily. Prescriptions Sent to Pharmacy Refills  
 leflunomide (ARAVA) 20 mg tablet 0 Sig: Take 1 Tab by mouth daily. Class: Normal  
 Pharmacy: [facility] [address] ZEINAB JOSHI [address] [facility] Ph #: [phone] Route: Oral  
  
We Performed the Following CBC WITH AUTOMATED DIFF [09965 CPT(R)] CHRONIC HEPATITIS PANEL [IQP5194 Custom] METABOLIC PANEL, COMPREHENSIVE [73815 CPT(R)] PROTEIN ELECTROPHORESIS W/ REFLX CHASE [[id number] Custom] QUANTIFERON-TB GOLD PLUS [id number] Custom] SED RATE (ESR) [id number] CPT(R)] VITAMIN D, 25 HYDROXY [id number] CPT(R)] Follow-up Instructions Return in about 6 months (around 4/15/2019). To-Do List   
 01/15/2019 Lab:  CBC WITH AUTOMATED DIFF   
  
 01/15/2019 Lab:  METABOLIC PANEL, COMPREHENSIVE   
  
 01/15/2019 Lab:  SED RATE (ESR) Introducing [facility] & HEALTH SERVICES!    
 [facility] introduces [employer] patient portal. Now you can access parts of your medical record, email your doctor's office, and request medication refills online. 1. In your internet browser, go to [email or web address] 2. Click on the First Time User? Click Here link in the Sign In box. You will see the New Member Sign Up page. 3. Enter your [employer] Access Code exactly as it appears below. You will not need to use this code after youve completed the sign-up process. If you do not sign up before the expiration date, you must request a new code. · [employer] Access Code: [email or web address] Expires: 1/13/2019 11:15 AM 
 
4. Enter the last four digits of your Social Security Number (xxxx) and Date of Birth (mm/dd/yyyy) as indicated and click Submit. You will be taken to the next sign-up page. 5. Create a [employer] ID. This will be your [employer] login ID and cannot be changed, so think of one that is secure and easy to remember. 6. Create a [employer] password. You can change your password at any time. 7. Enter your Password Reset Question and Answer. This can be used at a later time if you forget your password. 8. Enter your e-mail address. You will receive e-mail notification when new information is available in [address] 9. Click Sign Up. You can now view and download portions of your medical record. 10. Click the Download Summary menu link to download a portable copy of your medical information. If you have questions, please visit the Frequently Asked Questions section of the [employer] website. Remember, [employer] is NOT to be used for urgent needs. For medical emergencies, dial 911. Now available from your iPhone and Android! Please provide this summary of care documentation to your next provider. Your primary care clinician is listed as Johnathan Giles. If you have any questions after today's visit, please call [phone].

## 2018-10-15 NOTE — PROGRESS NOTES
REASON FOR VISIT    This is a follow-up visit for Ms. Lesley Eisenmenger for Seropositive Rheumatoid Arthritis. Inflammatory arthritis phenotype includes:  Anti-CCP positive: yes (>250)  Rheumatoid factor positive: yes (53.8)  Erosive disease: no  Extra-articular manifestations include: Sjogren's Syndrome    Immunosuppression Screening (6/28/2017): Quantiferon TB: negative  PPD:  Not performed  Hepatitis B: negative  Hepatitis C: negative    Therapy History includes:  Current DMARD therapy includes: leflunomide 20 mg daily  Prior DMARD therapy includes: hydroxychloroquine 400 daily, methotrexate 20 mg every Tuesday, leflunomide 20 mg   The following DMARDs have been ineffective: leflunomide  The following DMARDs were stopped because of side effects: methotrexate (nausea)    Immunizations: There is no immunization history on file for this patient. Active problems include:    Patient Active Problem List   Diagnosis Code    Seropositive [facility] multiple sites ([facility] M05.79    Long-term use of immunosuppressant medication Z79.899    Primary osteoarthritis of both knees M17.0    Primary osteoarthritis of both hands M19.041, M19.042    Secondary Sjogren's syndrome (HCC) M35.00    Vitamin D deficiency E55.9    MGUS (monoclonal gammopathy of unknown significance) D47.2    Stenosing tenosynovitis of finger M65.30    Leukopenia D72.819       HISTORY OF PRESENT ILLNESS    Ms. Lesley Eisenmenger returns for a follow-up. On her last visit, I continued monotherapy leflunomide 20 mg daily. Today, she feels good. She endorses a little stiffness in her fingers in the morning lasting minutes. She denies pain or swelling.     She denies fever, weight loss, blurred vision, vision loss, oral ulcers, ankle swelling, dry cough, dyspnea, nausea, vomiting, dysphagia, abdominal pain, black or bloody stool, fall since last visit, rash, easy bruising and increased thirst.    Ms. Lesley Eisenmenger has continued her medications for arthritis and reports good tolerance without significant side effects. Last toxicity monitoring by blood work was done on 4/04/2018 and did not reveal any significant adverse effects, except WBC 3.0. Most recent inflammatory markers from 4/04/2018 revealed a ESR 12 mm/hr (previously 14, 2, 9 mm/hr) and CRP 0.6 mg/L (previously 1.7, 0.7, 0.5 mg/L). The patient has not had any interval hospital admissions, infections, or surgeries. REVIEW OF SYSTEMS    A comprehensive review of systems was performed and pertinent results are documented in the HPI, review of systems is otherwise non-contributory. PAST MEDICAL HISTORY    She has a past medical history of Rheumatoid arthritis ([address] FAMILY HISTORY    Her family history includes Arthritis-rheumatoid in her paternal grandmother; Diabetes in her mother; Heart Attack in her father; Parkinson's Disease in her maternal grandmother. SOCIAL HISTORY    She reports that she has quit smoking. She has never used smokeless tobacco. She reports that she drinks alcohol. She reports that she does not use illicit drugs. MEDICATIONS    Current Outpatient Prescriptions   Medication Sig Dispense Refill    b complex vitamins tablet Take 1 Tab by mouth daily.  TURMERIC PO Take  by mouth two (2) times a day.  glucosamine-chondroitin (ARTHX) 500-400 mg cap Take 1 Cap by mouth two (2) times a day.  leflunomide (ARAVA) 20 mg tablet Take 1 Tab by mouth daily. 90 Tab 0    ergocalciferol (ERGOCALCIFEROL) 50,000 unit capsule Take 1 Cap by mouth every fourteen (14) days. 6 Cap 3    vitamin E (AQUA GEMS) 400 unit capsule Take  by mouth daily. ALLERGIES    Allergies   Allergen Reactions    Nickel Rash       PHYSICAL EXAMINATION    Visit Vitals    /84    Pulse 79    Temp 97.5 °F (36.4 °C)    Resp 18    Ht 5' 3\" (1.6 m)    Wt 156 lb (70.8 kg)    BMI 27.63 kg/m2     Body mass index is 27.63 kg/(m^2).     General: Patient is alert, oriented x 3, not in acute distress    HEENT:   Sclerae are not injected and appear moist.  Oral mucous membranes are moist, there are no ulcers present. Left lacrimal gland enlargement. Sight enlargement of parotids. Moist mucous with good salivary pooling. There is no alopecia. Neck is supple. Cardiovascular:  Heart is regular rate and rhythm, no murmurs. Chest:  Lungs are clear to auscultation bilaterally. No rhonchi, wheezes, or crackles. Extremities:  Free of clubbing, cyanosis, edema    Neurological exam:  No focal sensory deficits, muscle strength is full in upper and lower extremities     Skin exam:  There are no rashes, no alopecia, no discoid lesions, no active Raynaud's, no livedo reticularis, no periungual erythema. Musculoskeletal exam:  A comprehensive musculoskeletal exam was performed for all joints of each upper and lower extremity and assessed for swelling, tenderness and range of motion. Positive results are documented as below:    Bilateral Gabe and Heberden nodes. Bilateral knee crepitus without effusion.   Left 2nd A1 pulley crepitus without tenderness    Joint Count 10/15/2018 7/9/2018 4/4/2018 1/10/2018 9/28/2017 6/28/2017   Patient pain (0-100) 0 5 0.5 10 20 0   MHAQ 0 0 0 0 0 0   Left wrist- Tender - - - 1 - -   Left wrist- Swollen - - 1 1 - -   Left 2nd MCP - Swollen - 1 - - - -   Left 3rd MCP - Swollen - - - 1 1 -   Left 4th MCP - Swollen - - 1 - - -   Left 5th MCP - Tender - - 1 - - -   Left 5th MCP - Swollen - 1 1 - 1 -   Left 2nd PIP - Tender 0 1 1 1 1 -   Left 2nd PIP - Swollen 1 1 1 1 1 -   Left 3rd PIP - Tender - 1 1 - - -   Left 3rd PIP - Swollen - 1 1 - - -   Left 4th PIP - Swollen - 1 - - - -   Right 2nd MCP - Swollen - - - - 1 -   Right 3rd MCP - Swollen - - - 1 1 -   Right 2nd PIP - Tender - - 1 - - -   Right 2nd PIP - Swollen - 1 1 - - -   Right 3rd PIP - Tender 0 1 1 1 - 0   Right 3rd PIP - Swollen 1 1 1 1 1 1   Right 4th PIP - Tender - - 1 - - -   Right 4th PIP - Swollen - 1 1 - - -   Right 5th PIP - Tender - 1 1 - - -   Right 5th PIP - Swollen - 1 1 - - -   Right knee - Tender - - 1 - - -   Tender Joint Count (Total) 0 4 8 3 1 0   Swollen Joint Count (Total) 2 9 9 5 6 1   Physician Assessment (0-10) 0.5 3 2 3 2 0   Patient Assessment (0-10) 0 0.5 0 1 2 0   CDAI Total (calculated) 2.5 16.5 19 12 11 1       DATA REVIEW    Laboratory     Recent laboratory results were reviewed, summarized, and discussed with the patient. Imaging    Musculoskeletal Ultrasound    None    Radiographs    Bilateral Hand 6/28/2017: RIGHT: No fracture or dislocation on plain film. There are scattered degenerative changes of the interphalangeal joints. No joint space erosion or periosteal reaction. Alignment is within normal limits. Bone mineralization is within normal limits. No soft tissue calcification. LEFT: No fracture or dislocation on plain film. There are scattered degenerative changes of the interphalangeal joints. No joint space erosion or periosteal reaction. Alignment is within normal limits. Bone mineralization is within normal limits. No soft tissue calcification. Bilateral Foot 6/28/2017: RIGHT: No fracture or dislocation on plain film. No joint space narrowing. No joint space erosion or periosteal reaction. Alignment is within normal limits. Bone mineralization is within normal limits. There is minimal calcification at the Achilles insertion. LEFT: No fracture or dislocation on plain film. There are mild degenerative changes first MTP joint and scattered mild degenerative changes of the midfoot. No joint space erosion or periosteal reaction. Alignment is within normal limits. Bone mineralization is within normal limits. No soft tissue  calcification. CT Imaging    None    MR Imaging    None    DXA     None    ASSESSMENT AND PLAN    This is a follow-up visit for Ms. Kevin Jacob. 1) Seropositive Rheumatoid Arthritis. She is maintained on leflunomide 20 mg monotherapy.  She feels well. Her CDAI was 2.5 (previously 16.5, 19, 12, 11, 1) with 0 tender and 1 swollen joints, consistent with remission. I will continue leflunomide monotherapy. Labs today. Labs in 3 months. Follow up in 6 months. 2) Long Term Use of Immunosuppressants. The patient remains on immunomodulatory medications (leflunomide) and requires frequent toxicity monitoring by blood work. Respective labs were ordered (CBC and CMP). 3) Sjogren's Syndrome. (lacrimal and parotid gland hypertrophy, xerostomia, monoclonal gammopathy). She saw optometry. 4) Bilateral Knee Osteoarthritis. This was not an active issue today. 5) Bilateral Hand Osteoarthritis. This was not an active issue today. 6) MGUS. This is due to #1, 2. She follows with Dr. Vilma Cherry, hematology. 7) Left 2nd Stenosing Tenosynovitis. This is likely from #1. This was not an active issue today. 8) Vitamin D Deficiency. Her vitamin D level was 79.3 (previously 29.7). She was changed to every 14 days ergocalciferol 50,000. I will check her level today. 9) Right Cervical Radiculopathy. She declined physical therapy and prefers seeing her massage therapist. This was not an active issue today. The patient voiced understanding of the aforementioned assessment and plan. Summary of plan was provided in the After Visit Summary patient instructions.      TODAY'S ORDERS    Orders Placed This Encounter    QUANTIFERON-TB GOLD PLUS    CBC WITH AUTOMATED DIFF    METABOLIC PANEL, COMPREHENSIVE    SED RATE (ESR)    VITAMIN D, 25 HYDROXY    CHRONIC HEPATITIS PANEL    PROTEIN ELECTROPHORESIS W/ REFLX CHASE    CBC WITH AUTOMATED DIFF    METABOLIC PANEL, COMPREHENSIVE    SED RATE (ESR)    leflunomide (ARAVA) 20 mg tablet     Future Appointments  Date Time Provider Department Center   4/15/2019 10:40 AM Ardath Galas, MD [address], [place], [address]    Adult Rheumatology   Rheumatology Ultrasound Certified  [facility]  [facility], [address]   Phone [phone]  Fax [phone]

## 2018-10-19 LAB
25(OH)D3+25(OH)D2 SERPL-MCNC: 48 NG/ML (ref 30–100)
ALBUMIN SERPL ELPH-MCNC: 3.8 G/DL (ref 2.9–4.4)
ALBUMIN SERPL-MCNC: 4.5 G/DL (ref 3.6–4.8)
ALBUMIN/GLOB SERPL: 1.3 {RATIO} (ref 0.7–1.7)
ALBUMIN/GLOB SERPL: 2 {RATIO} (ref 1.2–2.2)
ALP SERPL-CCNC: 91 IU/L (ref 39–117)
ALPHA1 GLOB SERPL ELPH-MCNC: 0.3 G/DL (ref 0–0.4)
ALPHA2 GLOB SERPL ELPH-MCNC: 0.8 G/DL (ref 0.4–1)
ALT SERPL-CCNC: 28 IU/L (ref 0–32)
AST SERPL-CCNC: 24 IU/L (ref 0–40)
B-GLOBULIN SERPL ELPH-MCNC: 1.2 G/DL (ref 0.7–1.3)
BASOPHILS # BLD AUTO: 0 X10E3/UL (ref 0–0.2)
BASOPHILS NFR BLD AUTO: 0 %
BILIRUB SERPL-MCNC: 0.2 MG/DL (ref 0–1.2)
BUN SERPL-MCNC: 7 MG/DL (ref 8–27)
BUN/CREAT SERPL: 10 (ref 12–28)
CALCIUM SERPL-MCNC: 9.6 MG/DL (ref 8.7–10.3)
CHLORIDE SERPL-SCNC: 102 MMOL/L (ref 96–106)
CO2 SERPL-SCNC: 23 MMOL/L (ref 20–29)
COMMENT, 144067: NORMAL
CREAT SERPL-MCNC: 0.7 MG/DL (ref 0.57–1)
EOSINOPHIL # BLD AUTO: 0.1 X10E3/UL (ref 0–0.4)
EOSINOPHIL NFR BLD AUTO: 2 %
ERYTHROCYTE [DISTWIDTH] IN BLOOD BY AUTOMATED COUNT: 15.2 % (ref 12.3–15.4)
ERYTHROCYTE [SEDIMENTATION RATE] IN BLOOD BY WESTERGREN METHOD: 10 MM/HR (ref 0–40)
GAMMA GLOB SERPL ELPH-MCNC: 0.7 G/DL (ref 0.4–1.8)
GLOBULIN SER CALC-MCNC: 2.2 G/DL (ref 1.5–4.5)
GLOBULIN SER CALC-MCNC: 2.9 G/DL (ref 2.2–3.9)
GLUCOSE SERPL-MCNC: 82 MG/DL (ref 65–99)
HBV CORE AB SERPL QL IA: NEGATIVE
HBV CORE IGM SERPL QL IA: POSITIVE
HBV E AB SERPL QL IA: NEGATIVE
HBV E AG SERPL QL IA: NEGATIVE
HBV SURFACE AB SER QL: NON REACTIVE
HBV SURFACE AG SERPL QL IA: NEGATIVE
HCT VFR BLD AUTO: 41 % (ref 34–46.6)
HCV AB S/CO SERPL IA: <0.1 S/CO RATIO (ref 0–0.9)
HGB BLD-MCNC: 13.1 G/DL (ref 11.1–15.9)
IGA SERPL-MCNC: 144 MG/DL (ref 87–352)
IGG SERPL-MCNC: 513 MG/DL (ref 700–1600)
IGM SERPL-MCNC: 25 MG/DL (ref 26–217)
IMM GRANULOCYTES # BLD: 0 X10E3/UL (ref 0–0.1)
IMM GRANULOCYTES NFR BLD: 0 %
INTERPRETATION SERPL IEP-IMP: ABNORMAL
LYMPHOCYTES # BLD AUTO: 1.2 X10E3/UL (ref 0.7–3.1)
LYMPHOCYTES NFR BLD AUTO: 47 %
M PROTEIN SERPL ELPH-MCNC: 0.3 G/DL
MCH RBC QN AUTO: 28.1 PG (ref 26.6–33)
MCHC RBC AUTO-ENTMCNC: 32 G/DL (ref 31.5–35.7)
MCV RBC AUTO: 88 FL (ref 79–97)
MONOCYTES # BLD AUTO: 0.3 X10E3/UL (ref 0.1–0.9)
MONOCYTES NFR BLD AUTO: 11 %
MORPHOLOGY BLD-IMP: ABNORMAL
NEUTROPHILS # BLD AUTO: 1 X10E3/UL (ref 1.4–7)
NEUTROPHILS NFR BLD AUTO: 40 %
PLATELET # BLD AUTO: 285 X10E3/UL (ref 150–379)
PLEASE NOTE, 011150: ABNORMAL
POTASSIUM SERPL-SCNC: 4.9 MMOL/L (ref 3.5–5.2)
PROT PATTERN SERPL ELPH-IMP: ABNORMAL
PROT SERPL-MCNC: 6.7 G/DL (ref 6–8.5)
RBC # BLD AUTO: 4.67 X10E6/UL (ref 3.77–5.28)
SODIUM SERPL-SCNC: 141 MMOL/L (ref 134–144)
WBC # BLD AUTO: 2.6 X10E3/UL (ref 3.4–10.8)

## 2018-10-22 LAB
GAMMA INTERFERON BACKGROUND BLD IA-ACNC: 0.09 IU/ML
M TB IFN-G BLD-IMP: NEGATIVE
M TB IFN-G CD4+ BCKGRND COR BLD-ACNC: 0.05 IU/ML
MITOGEN IGNF BLD-ACNC: >10 IU/ML
QUANTIFERON INCUBATION, QF1T: NORMAL
QUANTIFERON TB2 AG: 0.09 IU/ML
SERVICE CMNT-IMP: NORMAL

## 2018-10-22 NOTE — PROGRESS NOTES
The results were reviewed and a letter was sent. Stable low WBC. slighl low neutrophils (1.0). Positive HBcIgM, this is likely a false positive due to cross-reactivity. Stable MGUS with M-spike 0.3 with stable slightly low IgG and IgM. Stable overall.

## 2019-04-05 DIAGNOSIS — Z79.60 LONG-TERM USE OF IMMUNOSUPPRESSANT MEDICATION: ICD-10-CM

## 2019-04-05 DIAGNOSIS — M05.79 SEROPOSITIVE RHEUMATOID ARTHRITIS OF MULTIPLE SITES (HCC): ICD-10-CM

## 2019-04-05 RX ORDER — LEFLUNOMIDE 20 MG/1
20 TABLET ORAL DAILY
Qty: 90 TAB | Refills: 0 | Status: SHIPPED | OUTPATIENT
Start: 2019-04-05 | End: 2019-10-24 | Stop reason: SDUPTHER

## 2019-04-18 ENCOUNTER — OFFICE VISIT (OUTPATIENT)
Dept: RHEUMATOLOGY | Age: 69
End: 2019-04-18

## 2019-04-18 VITALS
BODY MASS INDEX: 28 KG/M2 | DIASTOLIC BLOOD PRESSURE: 72 MMHG | SYSTOLIC BLOOD PRESSURE: 109 MMHG | RESPIRATION RATE: 18 BRPM | TEMPERATURE: 97.8 F | HEIGHT: 63 IN | WEIGHT: 158 LBS | HEART RATE: 82 BPM

## 2019-04-18 DIAGNOSIS — M05.79 SEROPOSITIVE RHEUMATOID ARTHRITIS OF MULTIPLE SITES (HCC): Primary | ICD-10-CM

## 2019-04-18 DIAGNOSIS — Z79.60 LONG-TERM USE OF IMMUNOSUPPRESSANT MEDICATION: ICD-10-CM

## 2019-04-18 DIAGNOSIS — M35.00 SECONDARY SJOGREN'S SYNDROME (HCC): ICD-10-CM

## 2019-04-18 DIAGNOSIS — K14.6 BURNING MOUTH SYNDROME: ICD-10-CM

## 2019-04-18 DIAGNOSIS — E55.9 VITAMIN D DEFICIENCY: ICD-10-CM

## 2019-04-18 DIAGNOSIS — D47.2 MGUS (MONOCLONAL GAMMOPATHY OF UNKNOWN SIGNIFICANCE): ICD-10-CM

## 2019-04-18 RX ORDER — PILOCARPINE HYDROCHLORIDE 5 MG/1
5 TABLET, FILM COATED ORAL 3 TIMES DAILY
Qty: 90 TAB | Refills: 3 | Status: SHIPPED | OUTPATIENT
Start: 2019-04-18 | End: 2020-05-04 | Stop reason: SDUPTHER

## 2019-04-18 NOTE — PROGRESS NOTES
REASON FOR VISIT This is a follow-up visit for Ms. Payton Car for Seropositive Rheumatoid Arthritis. Inflammatory arthritis phenotype includes: Anti-CCP positive: yes (>250) Rheumatoid factor positive: yes (53.8) Erosive disease: no 
Extra-articular manifestations include: Sjogren's Syndrome Immunosuppression Screening (10/15/2018): Quantiferon TB: negative PPD:  Not performed Hepatitis B: negative Hepatitis C: negative Therapy History includes: 
Current DMARD therapy includes: leflunomide 20 mg daily (7/26/2018 to 11/2018; 2/2019 to present) Prior DMARD therapy includes: hydroxychloroquine 400 daily, methotrexate 20 mg every Tuesday, leflunomide 20 mg The following DMARDs have been ineffective: none The following DMARDs were stopped because of side effects: methotrexate (nausea) Immunizations: There is no immunization history on file for this patient. Active problems include: 
 
Patient Active Problem List  
Diagnosis Code  Seropositive rheumatoid arthritis of multiple sites ([facility] M05.79  Long-term use of immunosuppressant medication Z79.899  Primary osteoarthritis of both knees M17.0  Primary osteoarthritis of both hands M19.041, [id number]  Secondary S[facility]'s [facility] M35.00  Vitamin D deficiency E55.9  MGUS (monoclonal gammopathy of unknown significance) D47.2  Stenosing tenosynovitis of finger M65.30  Leukopenia D72.819  
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 
 
Ms. Payton Car returns for a follow-up. On her last visit, I continued monotherapy leflunomide 20 mg daily. She stopped leflunomide 20 mg for 3 months (11/2018 to 1/2019) due to sore tongue which improved but did not resolve. Oral thrush resolved. She resumed it 2/2019 and has had recurrence of sore tongue. She saw ENT who treated her for thrush and burning tongue (swish and spit, fluconazole) which would help temporarily.  She reports that after having dental implants in 2/2017, she started having oral thrush after being treated with antibiotics. Today, she feels good. She denies pain, swelling, or stiffness in her joints. She did feel joint pain after being off lelfunomide for 3 months She endorses tongue sore and bumps on the back of her tongue. She denies fever, weight loss, blurred vision, vision loss, oral ulcers, ankle swelling, dry cough, dyspnea, nausea, vomiting, dysphagia, abdominal pain, black or bloody stool, fall since last visit, rash, easy bruising and increased thirst. 
 
Ms. Tess Stanley has continued her medications for arthritis and reports good tolerance without significant side effects. Last toxicity monitoring by blood work was done on 10/15/218 and did not reveal any significant adverse effects, except WBC 2.6. Most recent inflammatory markers from 10/15/218 revealed a ESR 10 mm/hr (previously 12, 14, 2, 9 mm/hr) and CRP 1.0 mg/L (previously 0.6, 1.7, 0.7, 0.5 mg/L). The patient has not had any interval hospital admissions, infections, or surgeries. REVIEW OF SYSTEMS A comprehensive review of systems was performed and pertinent results are documented in the HPI, review of systems is otherwise non-contributory. PAST MEDICAL HISTORY She has a past medical history of Rheumatoid arthritis ([facility] FAMILY HISTORY Her family history includes Arthritis-rheumatoid in her paternal grandmother; Diabetes in her mother; Heart Attack in her father; Parkinson's Disease in her maternal grandmother. SOCIAL HISTORY She reports that she has quit smoking. She has never used smokeless tobacco. She reports that she drinks alcohol. She reports that she does not use drugs. MEDICATIONS Current Outpatient Medications Medication Sig Dispense Refill  pilocarpine (SALAGEN) 5 mg tablet Take 1 Tab by mouth three (3) times daily. 90 Tab 3  
 leflunomide (ARAVA) 20 mg tablet Take 1 Tab by mouth daily.  90 Tab 0  
  b complex vitamins tablet Take 1 Tab by mouth daily.  ergocalciferol (ERGOCALCIFEROL) 50,000 unit capsule Take 1 Cap by mouth every fourteen (14) days. 6 Cap 3  
 vitamin E (AQUA GEMS) 400 unit capsule Take  by mouth daily. ALLERGIES Allergies Allergen Reactions  Nickel Rash PHYSICAL EXAMINATION Visit Vitals /72 Pulse 82 Temp 97.8 °F (36.6 °C) Resp 18 Ht 5' 3\" (1.6 m) Wt 158 lb (71.7 kg) BMI 27.99 kg/m² Body mass index is 27.99 kg/m². General: Patient is alert, oriented x 3, not in acute distress HEENT:  
Sclerae are not injected and appear moist. 
Oral mucous membranes are moist, there are no ulcers present. Left lacrimal gland enlargement. Sight enlargement of parotids. Moist mucous with good salivary pooling. There is no alopecia. Neck is supple. No thrush. Cardiovascular: 
Heart is regular rate and rhythm, no murmurs. Chest: 
Lungs are clear to auscultation bilaterally. No rhonchi, wheezes, or crackles. Extremities: 
Free of clubbing, cyanosis, edema Neurological exam: No focal sensory deficits, muscle strength is full in upper and lower extremities Skin exam: 
There are no rashes, no alopecia, no discoid lesions, no active Raynaud's, no livedo reticularis, no periungual erythema. Musculoskeletal exam: A comprehensive musculoskeletal exam was performed for all joints of each upper and lower extremity and assessed for swelling, tenderness and range of motion. Positive results are documented as below: 
 
Bilateral Gabe and Heberden nodes. Bilateral knee crepitus without effusion. Left 2nd A1 pulley crepitus without tenderness Joint Count 4/18/2019 10/15/2018 7/9/2018 4/4/2018 1/10/2018 9/28/2017 6/28/2017 Patient pain (0-100) 5 0 5 0.5 10 20 0 MHAQ 0 0 0 0 0 0 0 Left wrist- Tender - - - - 1 - - Left wrist- Swollen - - - 1 1 - - Left 2nd MCP - Swollen - - 1 - - - - Left 3rd MCP - Swollen - - - - 1 1 -  
 Left 4th MCP - Swollen - - - 1 - - - Left 5th MCP - Tender - - - 1 - - - Left 5th MCP - Swollen - - 1 1 - 1 - Left 2nd PIP - Tender 0 0 1 1 1 1 - Left 2nd PIP - Swollen 1 1 1 1 1 1 - Left 3rd PIP - Tender 0 - 1 1 - - - Left 3rd PIP - Swollen 1 - 1 1 - - - Left 4th PIP - Swollen - - 1 - - - - Right 2nd MCP - Swollen - - - - - 1 - Right 3rd MCP - Swollen - - - - 1 1 - Right 2nd PIP - Tender - - - 1 - - - Right 2nd PIP - Swollen - - 1 1 - - - Right 3rd PIP - Tender 0 0 1 1 1 - 0 Right 3rd PIP - Swollen 1 1 1 1 1 1 1 Right 4th PIP - Tender - - - 1 - - - Right 4th PIP - Swollen - - 1 1 - - - Right 5th PIP - Tender - - 1 1 - - - Right 5th PIP - Swollen - - 1 1 - - - Right knee - Tender - - - 1 - - - Tender Joint Count (Total) 0 0 4 8 3 1 0 Swollen Joint Count (Total) 3 2 9 9 5 6 1 Physician Assessment (0-10) 1 0.5 3 2 3 2 0 Patient Assessment (0-10) 2 0 0.5 0 1 2 0  
CDAI Total (calculated) 6 2.5 16.5 19 12 11 1 DATA REVIEW Laboratory Recent laboratory results were reviewed, summarized, and discussed with the patient. Imaging Musculoskeletal Ultrasound None Radiographs Bilateral Hand 6/28/2017: RIGHT: No fracture or dislocation on plain film. There are scattered degenerative changes of the interphalangeal joints. No joint space erosion or periosteal reaction. Alignment is within normal limits. Bone mineralization is within normal limits. No soft tissue calcification. LEFT: No fracture or dislocation on plain film. There are scattered degenerative changes of the interphalangeal joints. No joint space erosion or periosteal reaction. Alignment is within normal limits. Bone mineralization is within normal limits. No soft tissue calcification. Bilateral Foot 6/28/2017: RIGHT: No fracture or dislocation on plain film. No joint space narrowing. No joint space erosion or periosteal reaction. Alignment is within normal limits. Bone mineralization is within normal limits. There is minimal calcification at the Achilles insertion. LEFT: No fracture or dislocation on plain film. There are mild degenerative changes first MTP joint and scattered mild degenerative changes of the midfoot. No joint space erosion or periosteal reaction. Alignment is within normal limits. Bone mineralization is within normal limits. No soft tissue 
calcification. CT Imaging None MR Imaging None DXA None ASSESSMENT AND PLAN This is a follow-up visit for Ms. Chris Astudillo. 1) Seropositive Rheumatoid Arthritis. She is maintained on leflunomide 20 mg monotherapy. She feels well regarding her joints. Her CDAI was 6 (previously 2.5, 16.5, 19, 12, 11, 1) with 0 tender and 3 swollen joints, consistent with low disease activity. She stopped leflunomide due to worsening burning mouth syndrome and oral thrush which was improved with treatment with Nystatin and fluconazole by ENT. Labs today. Labs in 3 months. Follow up in 6 months. 2) Long Term Use of Immunosuppressants. The patient remains on immunomodulatory medications (leflunomide) and requires frequent toxicity monitoring by blood work. Respective labs were ordered (CBC and CMP). 3) Sjogren's Syndrome. (lacrimal and parotid gland hypertrophy, xerostomia, monoclonal gammopathy). This could be contributing to burning mouth. I will submit for pilocarpine to see if that helps. 4) Bilateral Knee Osteoarthritis. This was not an active issue today. 5) Bilateral Hand Osteoarthritis. This was not an active issue today. 6) MGUS. Her M-spike was 0.2. This is due to #1, 2. She follows with Dr. Marlon Charlton, hematology. 7) Left 2nd Stenosing Tenosynovitis. This is likely from #1. This was not an active issue today. 8) Vitamin D Deficiency. Her vitamin D level was 48.0 (previously 79.3, 29.7). She is maintained on every 14 days ergocalciferol 50,000.  I will check her level today. 9) Right Cervical Radiculopathy. She is now doing physical therapy. 10) Burning Mouth Syndrome. She developed this after dental surgery and not after initiation of leflunomide, so unlikely responsible. Oral thrush may be due to poor salivary flow in addition to immunosuppression. I recommended she follow up with ENT. The patient voiced understanding of the aforementioned assessment and plan. Summary of plan was provided in the After Visit Summary patient instructions. TODAY'S ORDERS Orders Placed This Encounter  CBC WITH AUTOMATED DIFF  
 METABOLIC PANEL, COMPREHENSIVE  C REACTIVE PROTEIN, QT  
 SED RATE (ESR)  CBC WITH AUTOMATED DIFF  
 METABOLIC PANEL, COMPREHENSIVE  C REACTIVE PROTEIN, QT  
 SED RATE (ESR)  VITAMIN D, 25 HYDROXY  pilocarpine (SALAGEN) 5 mg tablet Future Appointments Date Time Provider Alyssa Ha 10/21/2019 10:20 AM Portia Griffin MD [facility] Armin Jacobson MD, [facility] Adult Rheumatology Rheumatology Ultrasound Certified [facility]in Perea A Part of [facility], [address] [place] [address], [address] [place] [address] Phone [phone] Fax [phone]

## 2019-04-18 NOTE — PROGRESS NOTES
Chief Complaint Patient presents with  Arthritis 1. Have you been to the ER, urgent care clinic since your last visit? Hospitalized since your last visit? No 
 
2. Have you seen or consulted any other health care providers outside of the [address] [place] [address] since your last visit? Include any pap smears or colon screening. Yes When: March 2019 Where: ENT Reason for visit: Nabeel Bynum

## 2019-04-19 LAB
25(OH)D3+25(OH)D2 SERPL-MCNC: 46.3 NG/ML (ref 30–100)
ALBUMIN SERPL-MCNC: 4.4 G/DL (ref 3.6–4.8)
ALBUMIN/GLOB SERPL: 2.4 {RATIO} (ref 1.2–2.2)
ALP SERPL-CCNC: 87 IU/L (ref 39–117)
ALT SERPL-CCNC: 19 IU/L (ref 0–32)
AST SERPL-CCNC: 20 IU/L (ref 0–40)
BASOPHILS # BLD AUTO: 0 X10E3/UL (ref 0–0.2)
BASOPHILS NFR BLD AUTO: 0 %
BILIRUB SERPL-MCNC: <0.2 MG/DL (ref 0–1.2)
BUN SERPL-MCNC: 11 MG/DL (ref 8–27)
BUN/CREAT SERPL: 16 (ref 12–28)
CALCIUM SERPL-MCNC: 9.3 MG/DL (ref 8.7–10.3)
CHLORIDE SERPL-SCNC: 103 MMOL/L (ref 96–106)
CO2 SERPL-SCNC: 22 MMOL/L (ref 20–29)
CREAT SERPL-MCNC: 0.69 MG/DL (ref 0.57–1)
CRP SERPL-MCNC: 0.6 MG/L (ref 0–4.9)
EOSINOPHIL # BLD AUTO: 0.1 X10E3/UL (ref 0–0.4)
EOSINOPHIL NFR BLD AUTO: 3 %
ERYTHROCYTE [DISTWIDTH] IN BLOOD BY AUTOMATED COUNT: 15.8 % (ref 12.3–15.4)
ERYTHROCYTE [SEDIMENTATION RATE] IN BLOOD BY WESTERGREN METHOD: 13 MM/HR (ref 0–40)
GLOBULIN SER CALC-MCNC: 1.8 G/DL (ref 1.5–4.5)
GLUCOSE SERPL-MCNC: 91 MG/DL (ref 65–99)
HCT VFR BLD AUTO: 36.4 % (ref 34–46.6)
HGB BLD-MCNC: 12.1 G/DL (ref 11.1–15.9)
IMM GRANULOCYTES # BLD AUTO: 0 X10E3/UL (ref 0–0.1)
IMM GRANULOCYTES NFR BLD AUTO: 0 %
LYMPHOCYTES # BLD AUTO: 1.2 X10E3/UL (ref 0.7–3.1)
LYMPHOCYTES NFR BLD AUTO: 43 %
MCH RBC QN AUTO: 27.6 PG (ref 26.6–33)
MCHC RBC AUTO-ENTMCNC: 33.2 G/DL (ref 31.5–35.7)
MCV RBC AUTO: 83 FL (ref 79–97)
MONOCYTES # BLD AUTO: 0.4 X10E3/UL (ref 0.1–0.9)
MONOCYTES NFR BLD AUTO: 16 %
NEUTROPHILS # BLD AUTO: 1.1 X10E3/UL (ref 1.4–7)
NEUTROPHILS NFR BLD AUTO: 38 %
PLATELET # BLD AUTO: 240 X10E3/UL (ref 150–379)
POTASSIUM SERPL-SCNC: 4.6 MMOL/L (ref 3.5–5.2)
PROT SERPL-MCNC: 6.2 G/DL (ref 6–8.5)
RBC # BLD AUTO: 4.39 X10E6/UL (ref 3.77–5.28)
SODIUM SERPL-SCNC: 139 MMOL/L (ref 134–144)
WBC # BLD AUTO: 2.8 X10E3/UL (ref 3.4–10.8)

## 2019-09-27 ENCOUNTER — TELEPHONE (OUTPATIENT)
Dept: RHEUMATOLOGY | Age: 69
End: 2019-09-27

## 2019-09-27 NOTE — TELEPHONE ENCOUNTER
----- Message from Jeff Majano sent at 9/26/2019  3:19 PM EDT -----  Regarding: Dr. July Jose first and last name:Isabel Pineda Ba      Reason for call: new order for lab work      Callback required yes/no and why:no      Best contact number(s): [phone]      Details to clarify the request: Pt stated she lost her order for blood work and would like a new order mailed to her address on file. Pt has an appt on 11/04/2019.       Jeff Majano

## 2019-09-27 NOTE — TELEPHONE ENCOUNTER
----- Message from Silvano Ding sent at 9/26/2019  3:19 PM EDT -----  Regarding: Dr. Ajith De La Torre first and last name:Isabel Chau Kidney      Reason for call: new order for lab work      Callback required yes/no and why:no      Best contact number(s): [phone]      Details to clarify the request: Pt stated she lost her order for blood work and would like a new order mailed to her address on file. Pt has an appt on 11/04/2019.       Silvano Ding

## 2019-09-30 DIAGNOSIS — M05.79 SEROPOSITIVE RHEUMATOID ARTHRITIS OF MULTIPLE SITES (HCC): Primary | ICD-10-CM

## 2019-10-24 ENCOUNTER — TELEPHONE (OUTPATIENT)
Dept: RHEUMATOLOGY | Age: 69
End: 2019-10-24

## 2019-10-24 DIAGNOSIS — M05.79 SEROPOSITIVE RHEUMATOID ARTHRITIS OF MULTIPLE SITES (HCC): ICD-10-CM

## 2019-10-24 DIAGNOSIS — Z79.60 LONG-TERM USE OF IMMUNOSUPPRESSANT MEDICATION: ICD-10-CM

## 2019-10-24 RX ORDER — LEFLUNOMIDE 20 MG/1
20 TABLET ORAL DAILY
Qty: 90 TAB | Refills: 0 | Status: SHIPPED | OUTPATIENT
Start: 2019-10-24 | End: 2020-01-27

## 2019-10-29 LAB
ALBUMIN SERPL-MCNC: 4.3 G/DL (ref 3.5–4.8)
ALBUMIN/GLOB SERPL: 2.3 {RATIO} (ref 1.2–2.2)
ALP SERPL-CCNC: 89 IU/L (ref 39–117)
ALT SERPL-CCNC: 15 IU/L (ref 0–32)
AST SERPL-CCNC: 19 IU/L (ref 0–40)
BASOPHILS # BLD AUTO: 0 X10E3/UL (ref 0–0.2)
BASOPHILS NFR BLD AUTO: 0 %
BILIRUB SERPL-MCNC: <0.2 MG/DL (ref 0–1.2)
BUN SERPL-MCNC: 7 MG/DL (ref 8–27)
BUN/CREAT SERPL: 10 (ref 12–28)
CALCIUM SERPL-MCNC: 9.1 MG/DL (ref 8.7–10.3)
CHLORIDE SERPL-SCNC: 107 MMOL/L (ref 96–106)
CO2 SERPL-SCNC: 22 MMOL/L (ref 20–29)
CREAT SERPL-MCNC: 0.69 MG/DL (ref 0.57–1)
CRP SERPL-MCNC: 1 MG/L (ref 0–10)
EOSINOPHIL # BLD AUTO: 0.1 X10E3/UL (ref 0–0.4)
EOSINOPHIL NFR BLD AUTO: 2 %
ERYTHROCYTE [DISTWIDTH] IN BLOOD BY AUTOMATED COUNT: 14.7 % (ref 12.3–15.4)
ERYTHROCYTE [SEDIMENTATION RATE] IN BLOOD BY WESTERGREN METHOD: 22 MM/HR (ref 0–40)
GLOBULIN SER CALC-MCNC: 1.9 G/DL (ref 1.5–4.5)
GLUCOSE SERPL-MCNC: 106 MG/DL (ref 65–99)
HCT VFR BLD AUTO: 37 % (ref 34–46.6)
HGB BLD-MCNC: 12.2 G/DL (ref 11.1–15.9)
IMM GRANULOCYTES # BLD AUTO: 0 X10E3/UL (ref 0–0.1)
IMM GRANULOCYTES NFR BLD AUTO: 0 %
LYMPHOCYTES # BLD AUTO: 1.4 X10E3/UL (ref 0.7–3.1)
LYMPHOCYTES NFR BLD AUTO: 45 %
MCH RBC QN AUTO: 27.8 PG (ref 26.6–33)
MCHC RBC AUTO-ENTMCNC: 33 G/DL (ref 31.5–35.7)
MCV RBC AUTO: 84 FL (ref 79–97)
MONOCYTES # BLD AUTO: 0.4 X10E3/UL (ref 0.1–0.9)
MONOCYTES NFR BLD AUTO: 12 %
NEUTROPHILS # BLD AUTO: 1.3 X10E3/UL (ref 1.4–7)
NEUTROPHILS NFR BLD AUTO: 41 %
PLATELET # BLD AUTO: 312 X10E3/UL (ref 150–450)
POTASSIUM SERPL-SCNC: 4.1 MMOL/L (ref 3.5–5.2)
PROT SERPL-MCNC: 6.2 G/DL (ref 6–8.5)
RBC # BLD AUTO: 4.39 X10E6/UL (ref 3.77–5.28)
SODIUM SERPL-SCNC: 143 MMOL/L (ref 134–144)
WBC # BLD AUTO: 3.1 X10E3/UL (ref 3.4–10.8)

## 2019-11-04 ENCOUNTER — OFFICE VISIT (OUTPATIENT)
Dept: RHEUMATOLOGY | Age: 69
End: 2019-11-04

## 2019-11-04 VITALS
DIASTOLIC BLOOD PRESSURE: 78 MMHG | SYSTOLIC BLOOD PRESSURE: 125 MMHG | HEIGHT: 63 IN | WEIGHT: 158 LBS | TEMPERATURE: 98.1 F | RESPIRATION RATE: 18 BRPM | BODY MASS INDEX: 28 KG/M2 | HEART RATE: 73 BPM

## 2019-11-04 DIAGNOSIS — M05.79 SEROPOSITIVE RHEUMATOID ARTHRITIS OF MULTIPLE SITES (HCC): Primary | ICD-10-CM

## 2019-11-04 DIAGNOSIS — Z79.60 LONG-TERM USE OF IMMUNOSUPPRESSANT MEDICATION: ICD-10-CM

## 2019-11-04 DIAGNOSIS — E55.9 VITAMIN D DEFICIENCY: ICD-10-CM

## 2019-11-04 DIAGNOSIS — D47.2 MGUS (MONOCLONAL GAMMOPATHY OF UNKNOWN SIGNIFICANCE): ICD-10-CM

## 2019-11-04 DIAGNOSIS — K14.6 BURNING MOUTH SYNDROME: ICD-10-CM

## 2019-11-04 DIAGNOSIS — M35.00 SECONDARY SJOGREN'S SYNDROME (HCC): ICD-10-CM

## 2019-11-04 RX ORDER — ERGOCALCIFEROL 1.25 MG/1
50000 CAPSULE ORAL
Qty: 6 CAP | Refills: 3 | Status: SHIPPED | OUTPATIENT
Start: 2019-11-04 | End: 2020-05-04 | Stop reason: SDUPTHER

## 2019-11-04 RX ORDER — ZINC GLUCONATE 10 MG
LOZENGE ORAL DAILY
COMMUNITY

## 2019-11-04 NOTE — Clinical Note
11/4/19 Patient: Michelle Merino YOB: 1950 Date of Visit: 11/4/2019 Yolande Gooden MD 
VIA Dear Yolande Gooden MD, Thank you for referring Ms. Rula Saba to [facility] for evaluation. My notes for this consultation are attached. If you have questions, please do not hesitate to call me. I look forward to following your patient along with you.  
 
 
Sincerely, 
 
Leroy Davis MD

## 2019-11-04 NOTE — PROGRESS NOTES
REASON FOR VISIT    This is a follow-up visit for Ms. Angie Palmer for    ICD-10-CM   1. Seropositive rheumatoid arthritis of multiple sites ([facility] M05.79   2. Secondary Sjogren's syndrome ([facility] M35.00   3. MGUS (monoclonal gammopathy of unknown significance) D47.2      Inflammatory arthritis phenotype includes:  Anti-CCP positive: yes (>250)  Rheumatoid factor positive: yes (53.8)  Erosive disease: no  Extra-articular manifestations include: Sjogren's Syndrome    Immunosuppression Screening (10/15/2018): Quantiferon TB: negative  PPD:  Not performed  Hepatitis B: negative  Hepatitis C: negative    Therapy History includes:  Current DMARD therapy includes: leflunomide 20 mg daily (7/26/2018 to 11/2018; 2/2019 to present)  Prior DMARD therapy includes: hydroxychloroquine 400 daily, methotrexate 20 mg every Tuesday, leflunomide 20 mg   The following DMARDs have been ineffective: none  The following DMARDs were stopped because of side effects: methotrexate (nausea)    Immunizations: There is no immunization history on file for this patient. 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 Angie Palmer returns for a follow-up. On her last visit 4/18/2019, I continued monotherapy leflunomide 20 mg daily. I prescribed Salagen which helped. She no longer has oral ulcers. Her burning tongue resolved. Today, she feels good.  She has morning stiffness in her hands lasting an hour that resolves with use. Otherwise, she denies pain, swelling, or stiffness in her joints. She denies fever, weight loss, blurred vision, vision loss, oral ulcers, ankle swelling, dry cough, dyspnea, nausea, vomiting, dysphagia, abdominal pain, black or bloody stool, fall since last visit, rash, easy bruising and increased thirst.    Ms. Maria Guadalupe Landon has continued her medications for arthritis and reports good tolerance without significant side effects. Last toxicity monitoring by blood work was done on 10/28/2019 and did not reveal any significant adverse effects, except WBC 3.1    Most recent inflammatory markers from 10/28/2019 revealed a ESR 22 mm/hr and CRP 1.0 mg/L. The patient has not had any interval hospital admissions, infections, or surgeries. REVIEW OF SYSTEMS    A comprehensive review of systems was performed and pertinent results are documented in the HPI, review of systems is otherwise non-contributory. PAST MEDICAL HISTORY    She has a past medical history of Rheumatoid arthritis ([address] FAMILY HISTORY    Her family history includes Arthritis-rheumatoid in her paternal grandmother; Diabetes in her mother; Heart Attack in her father; Parkinson's Disease in her maternal grandmother. SOCIAL HISTORY    She reports that she has quit smoking. She has never used smokeless tobacco. She reports that she drinks alcohol. She reports that she does not use drugs. MEDICATIONS    Current Outpatient Medications   Medication Sig Dispense Refill    GARLIC PO Take  by mouth daily.  magnesium 250 mg tab Take  by mouth daily.  ergocalciferol (ERGOCALCIFEROL) 50,000 unit capsule Take 1 Cap by mouth every fourteen (14) days. 6 Cap 3    leflunomide (ARAVA) 20 mg tablet Take 1 Tab by mouth daily. 90 Tab 0    vitamin E (AQUA GEMS) 400 unit capsule Take  by mouth daily.  pilocarpine (SALAGEN) 5 mg tablet Take 1 Tab by mouth three (3) times daily.  90 Tab 3        ALLERGIES    Allergies   Allergen Reactions    Nickel Rash       PHYSICAL EXAMINATION    Visit Vitals  /78   Pulse 73   Temp 98.1 °F (36.7 °C)   Resp 18   Ht 5' 3\" (1.6 m)   Wt 158 lb (71.7 kg)   BMI 27.99 kg/m²     Body mass index is 27.99 kg/m². General: Patient is alert, oriented x 3, not in acute distress    HEENT:   Sclerae are not injected and appear moist.  Oral mucous membranes are moist, there are no ulcers present. Left lacrimal gland enlargement. Sight enlargement of parotids. Moist mucous with good salivary pooling. There is no alopecia. Neck is supple. No thrush. Cardiovascular:  Heart is regular rate and rhythm, no murmurs. Chest:  Lungs are clear to auscultation bilaterally. No rhonchi, wheezes, or crackles. Extremities:  Free of clubbing, cyanosis, edema    Neurological exam:  No focal sensory deficits, muscle strength is full in upper and lower extremities     Skin exam:  There are no rashes, no alopecia, no discoid lesions, no active Raynaud's, no livedo reticularis, no periungual erythema. Musculoskeletal exam:  A comprehensive musculoskeletal exam was performed for all joints of each upper and lower extremity and assessed for swelling, tenderness and range of motion. Positive results are documented as below:    Bilateral Gabe and Heberden nodes. Bilateral knee crepitus without effusion.   Left 2nd A1 pulley crepitus without tenderness    Joint Count 11/4/2019 4/18/2019 10/15/2018 7/9/2018 4/4/2018 1/10/2018 9/28/2017   Patient pain (0-100) 2.5 5 0 5 0.5 10 20   MHAQ 0 0 0 0 0 0 0   Left wrist- Tender - - - - - 1 -   Left wrist- Swollen - - - - 1 1 -   Left 2nd MCP - Swollen - - - 1 - - -   Left 3rd MCP - Swollen - - - - - 1 1   Left 4th MCP - Swollen - - - - 1 - -   Left 5th MCP - Tender - - - - 1 - -   Left 5th MCP - Swollen - - - 1 1 - 1   Left 2nd PIP - Tender - 0 0 1 1 1 1   Left 2nd PIP - Swollen - 1 1 1 1 1 1   Left 3rd PIP - Tender - 0 - 1 1 - -   Left 3rd PIP - Swollen - 1 - 1 1 - -   Left 4th PIP - Swollen - - - 1 - - -   Right 2nd MCP - Swollen - - - - - - 1   Right 3rd MCP - Swollen - - - - - 1 1   Right 2nd PIP - Tender - - - - 1 - -   Right 2nd PIP - Swollen - - - 1 1 - -   Right 3rd PIP - Tender 1 0 0 1 1 1 -   Right 3rd PIP - Swollen 1 1 1 1 1 1 1   Right 4th PIP - Tender - - - - 1 - -   Right 4th PIP - Swollen - - - 1 1 - -   Right 5th PIP - Tender 1 - - 1 1 - -   Right 5th PIP - Swollen 1 - - 1 1 - -   Right knee - Tender - - - - 1 - -   Tender Joint Count (Total) 2 0 0 4 8 3 1   Swollen Joint Count (Total) 2 3 2 9 9 5 6   Physician Assessment (0-10) 0.5 1 0.5 3 2 3 2   Patient Assessment (0-10) 0.25 2 0 0.5 0 1 2   CDAI Total (calculated) 4.75 6 2.5 16.5 19 12 11       DATA REVIEW    Laboratory     Recent laboratory results were reviewed, summarized, and discussed with the patient. Imaging    Musculoskeletal Ultrasound    None    Radiographs    Bilateral Hand 6/28/2017: RIGHT: No fracture or dislocation on plain film. There are scattered degenerative changes of the interphalangeal joints. No joint space erosion or periosteal reaction. Alignment is within normal limits. Bone mineralization is within normal limits. No soft tissue calcification. LEFT: No fracture or dislocation on plain film. There are scattered degenerative changes of the interphalangeal joints. No joint space erosion or periosteal reaction. Alignment is within normal limits. Bone mineralization is within normal limits. No soft tissue calcification. Bilateral Foot 6/28/2017: RIGHT: No fracture or dislocation on plain film. No joint space narrowing. No joint space erosion or periosteal reaction. Alignment is within normal limits. Bone mineralization is within normal limits. There is minimal calcification at the Achilles insertion. LEFT: No fracture or dislocation on plain film. There are mild degenerative changes first MTP joint and scattered mild degenerative changes of the midfoot.  No joint space erosion or periosteal reaction. Alignment is within normal limits. Bone mineralization is within normal limits. No soft tissue  calcification. CT Imaging    None    MR Imaging    None    DXA     None    ASSESSMENT AND PLAN    This is a follow-up visit for Ms. Edvin Flores. 1) Seropositive Rheumatoid Arthritis. She is maintained on leflunomide 20 mg monotherapy with good tolerance. Her CDAI was 4.75 (previously 6, 2.5, 16.5, 19, 12, 11, 1) with 2 tender and 2 swollen joints, consistent with low disease activity. I will continue treatment. Follow up in 6 months. 2) Long Term Use of Immunosuppressants. The patient remains on immunomodulatory medications (leflunomide) and requires frequent toxicity monitoring by blood work. 3) Sjogren's Syndrome. (lacrimal and parotid gland hypertrophy, xerostomia, monoclonal gammopathy). This could be contributing to burning mouth. I tried her on pilocarpine which helped. She no longer has oral sores or burning tongue. She declined a refill. 4) Bilateral Knee Osteoarthritis. This was not an active issue today. 5) Bilateral Hand Osteoarthritis. This was not an active issue today. 6) MGUS. Her M-spike was 0.2. This is due to #1, 2. She follows with Dr. Freeman Kaur, hematology. 7) Left 2nd Stenosing Tenosynovitis. This is likely from #1. This was not an active issue today. 8) Vitamin D Deficiency. Her vitamin D level was 48.0 (previously 79.3, 29.7). She is maintained on every 14 days ergocalciferol 50,000. I refilled it    9) Right Cervical Radiculopathy. She has done physical therapy. This was not an active issue today. 10) Burning Mouth Syndrome. This improved with Salagen and has not been an active issue. See #3. The patient voiced understanding of the aforementioned assessment and plan. Summary of plan was provided in the After Visit Summary patient instructions.      TODAY'S ORDERS    Orders Placed This Encounter    ergocalciferol (ERGOCALCIFEROL) 50,000 unit capsule     Future Appointments   Date Time Provider Alyssa Ha   5/4/2020  4:00 PM MD Carlos Echols MD, [address]    Adult Rheumatology   Rheumatology Ultrasound Certified  [facility]  [facility] of [facility], [address] [facility]   Phone [phone]  Fax [phone]

## 2019-11-04 NOTE — PROGRESS NOTES
Chief Complaint   Patient presents with    Arthritis     1. Have you been to the ER, urgent care clinic since your last visit? Hospitalized since your last visit? No    2. Have you seen or consulted any other health care providers outside of the [address] [place] [address] since your last visit? Include any pap smears or colon screening.  No

## 2020-01-26 DIAGNOSIS — Z79.60 LONG-TERM USE OF IMMUNOSUPPRESSANT MEDICATION: ICD-10-CM

## 2020-01-26 DIAGNOSIS — M05.79 SEROPOSITIVE RHEUMATOID ARTHRITIS OF MULTIPLE SITES (HCC): ICD-10-CM

## 2020-01-27 RX ORDER — LEFLUNOMIDE 20 MG/1
TABLET ORAL
Qty: 90 TAB | Refills: 0 | Status: SHIPPED | OUTPATIENT
Start: 2020-01-27 | End: 2020-04-12

## 2020-04-12 DIAGNOSIS — Z79.60 LONG-TERM USE OF IMMUNOSUPPRESSANT MEDICATION: ICD-10-CM

## 2020-04-12 DIAGNOSIS — M05.79 SEROPOSITIVE RHEUMATOID ARTHRITIS OF MULTIPLE SITES (HCC): ICD-10-CM

## 2020-04-12 RX ORDER — LEFLUNOMIDE 20 MG/1
TABLET ORAL
Qty: 90 TAB | Refills: 0 | Status: SHIPPED | OUTPATIENT
Start: 2020-04-12 | End: 2020-05-01 | Stop reason: SDUPTHER

## 2020-05-04 ENCOUNTER — VIRTUAL VISIT (OUTPATIENT)
Dept: RHEUMATOLOGY | Age: 70
End: 2020-05-04

## 2020-05-04 DIAGNOSIS — E55.9 VITAMIN D DEFICIENCY: ICD-10-CM

## 2020-05-04 DIAGNOSIS — Z79.60 LONG-TERM USE OF IMMUNOSUPPRESSANT MEDICATION: ICD-10-CM

## 2020-05-04 DIAGNOSIS — D47.2 MGUS (MONOCLONAL GAMMOPATHY OF UNKNOWN SIGNIFICANCE): ICD-10-CM

## 2020-05-04 DIAGNOSIS — M35.00 SECONDARY SJOGREN'S SYNDROME (HCC): ICD-10-CM

## 2020-05-04 DIAGNOSIS — M05.79 SEROPOSITIVE RHEUMATOID ARTHRITIS OF MULTIPLE SITES (HCC): Primary | ICD-10-CM

## 2020-05-04 RX ORDER — ERGOCALCIFEROL 1.25 MG/1
50000 CAPSULE ORAL
Qty: 6 CAP | Refills: 3 | Status: SHIPPED | OUTPATIENT
Start: 2020-05-04 | End: 2020-11-04 | Stop reason: SDUPTHER

## 2020-05-04 RX ORDER — LEFLUNOMIDE 20 MG/1
TABLET ORAL
Qty: 90 TAB | Refills: 0 | Status: SHIPPED | OUTPATIENT
Start: 2020-05-04 | End: 2020-11-04 | Stop reason: SDUPTHER

## 2020-05-04 RX ORDER — PILOCARPINE HYDROCHLORIDE 5 MG/1
5 TABLET, FILM COATED ORAL 3 TIMES DAILY
Qty: 90 TAB | Refills: 3 | Status: SHIPPED | OUTPATIENT
Start: 2020-05-04

## 2020-05-04 NOTE — PROGRESS NOTES
REASON FOR VISIT    This is a follow-up visit for Ms. Mahogany Maxwell for    ICD-10-CM   1. Seropositive rheumatoid arthritis of multiple sites ([facility] M05.79   2. Secondary Sjogren's syndrome ([facility] M35.00   3. MGUS (monoclonal gammopathy of unknown significance) D47.2      The patient has consented for synchronous (real-time) Telemedicine (audio technology) on 5/4/2020 for their care to be delivered over telemedicine in place of their regularly scheduled office visit pursuant to the emergency declaration under the [address] and the [address] authority and the [employer] Act, this Virtual  Visit was conducted, with patient's consent, to reduce the patient's risk of exposure to COVID-19 and provide continuity of care for an established patient. Services were provided through an audio synchronous discussion virtually to substitute for in-person clinic visit. Inflammatory arthritis phenotype includes:  Anti-CCP positive: yes (>250)  Rheumatoid factor positive: yes (53.8)  Erosive disease: no  Extra-articular manifestations include: Sjogren's Syndrome    Immunosuppression Screening (10/15/2018): Quantiferon TB: negative  PPD:  Not performed  Hepatitis B: negative  Hepatitis C: negative    Therapy History includes:  Current DMARD therapy includes: leflunomide 20 mg daily (7/26/2018 to 11/2018; 2/2019 to present)  Prior DMARD therapy includes: hydroxychloroquine 400 daily, methotrexate 20 mg every Tuesday, leflunomide 20 mg   The following DMARDs have been ineffective: none  The following DMARDs were stopped because of side effects: methotrexate (nausea)    Immunizations: There is no immunization history on file for this patient.     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 Danilo Ho returns for a follow-up. On her last visit 4/18/2019, I continued monotherapy leflunomide 20 mg daily, which she has taken with good tolerance. Today, she feels good. She has morning stiffness in her hands lasting minutes that resolves with use. Otherwise, she denies pain, swelling, or stiffness in her joints. She denies fever, weight loss, blurred vision, vision loss, oral ulcers, ankle swelling, dry cough, dyspnea, nausea, vomiting, dysphagia, abdominal pain, black or bloody stool, fall since last visit, rash, easy bruising and increased thirst.    Last toxicity monitoring by blood work was done on 10/28/2019 and did not reveal any significant adverse effects, except WBC 3.1    Most recent inflammatory markers from 10/28/2019 revealed a ESR 22 mm/hr and CRP 1.0 mg/L. The patient has not had any interval hospital admissions, infections, or surgeries. REVIEW OF SYSTEMS    A comprehensive review of systems was performed and pertinent results are documented in the HPI, review of systems is otherwise non-contributory. PAST MEDICAL HISTORY    She has a past medical history of Rheumatoid arthritis ([facility] FAMILY HISTORY    Her family history includes Arthritis-rheumatoid in her paternal grandmother; Diabetes in her mother; Heart Attack in her father; Parkinson's Disease in her maternal grandmother. SOCIAL HISTORY    She reports that she has quit smoking. She has never used smokeless tobacco. She reports current alcohol use. She reports that she does not use drugs.     MEDICATIONS    Current Outpatient Medications Medication Sig Dispense Refill    leflunomide (ARAVA) 20 mg tablet Take 1 tablet by mouth once daily 90 Tab 0    ergocalciferol (ERGOCALCIFEROL) 1,250 mcg (50,000 unit) capsule Take 1 Cap by mouth every fourteen (14) days. 6 Cap 3    pilocarpine (SALAGEN) 5 mg tablet Take 1 Tab by mouth three (3) times daily. 90 Tab 3    zinc sulfate (ZINC-15 PO) Take  by mouth.  magnesium 250 mg tab Take  by mouth daily.  vitamin E (AQUA GEMS) 400 unit capsule Take  by mouth as needed. ALLERGIES    Allergies   Allergen Reactions    Nickel Rash       PHYSICAL EXAMINATION    There were no vitals taken for this visit. There is no height or weight on file to calculate BMI. General: Patient is alert, oriented x 3, not in acute distress    Chest:  Breathing comfortably at room air    Musculoskeletal exam:  A comprehensive musculoskeletal exam was NOT performed for all joints of each upper and lower extremity and assessed for swelling, tenderness and range of motion. Positive results are documented as below:    Previous Exam    Bilateral Gabe and Heberden nodes. Bilateral knee crepitus without effusion.   Left 2nd A1 pulley crepitus without tenderness    Joint Count 11/4/2019 4/18/2019 10/15/2018 7/9/2018 4/4/2018 1/10/2018 9/28/2017   Patient pain (0-100) 2.5 5 0 5 0.5 10 20   MHAQ 0 0 0 0 0 0 0   Left wrist- Tender - - - - - 1 -   Left wrist- Swollen - - - - 1 1 -   Left 2nd MCP - Swollen - - - 1 - - -   Left 3rd MCP - Swollen - - - - - 1 1   Left 4th MCP - Swollen - - - - 1 - -   Left 5th MCP - Tender - - - - 1 - -   Left 5th MCP - Swollen - - - 1 1 - 1   Left 2nd PIP - Tender - 0 0 1 1 1 1   Left 2nd PIP - Swollen - 1 1 1 1 1 1   Left 3rd PIP - Tender - 0 - 1 1 - -   Left 3rd PIP - Swollen - 1 - 1 1 - -   Left 4th PIP - Swollen - - - 1 - - -   Right 2nd MCP - Swollen - - - - - - 1   Right 3rd MCP - Swollen - - - - - 1 1   Right 2nd PIP - Tender - - - - 1 - -   Right 2nd PIP - Swollen - - - 1 1 - - Right 3rd PIP - Tender 1 0 0 1 1 1 -   Right 3rd PIP - Swollen 1 1 1 1 1 1 1   Right 4th PIP - Tender - - - - 1 - -   Right 4th PIP - Swollen - - - 1 1 - -   Right 5th PIP - Tender 1 - - 1 1 - -   Right 5th PIP - Swollen 1 - - 1 1 - -   Right knee - Tender - - - - 1 - -   Tender Joint Count (Total) 2 0 0 4 8 3 1   Swollen Joint Count (Total) 2 3 2 9 9 5 6   Physician Assessment (0-10) 0.5 1 0.5 3 2 3 2   Patient Assessment (0-10) 0.25 2 0 0.5 0 1 2   CDAI Total (calculated) 4.75 6 2.5 16.5 19 12 11       DATA REVIEW    Laboratory     Recent laboratory results were reviewed, summarized, and discussed with the patient. Imaging    Musculoskeletal Ultrasound    None    Radiographs    Bilateral Hand 6/28/2017: RIGHT: No fracture or dislocation on plain film. There are scattered degenerative changes of the interphalangeal joints. No joint space erosion or periosteal reaction. Alignment is within normal limits. Bone mineralization is within normal limits. No soft tissue calcification. LEFT: No fracture or dislocation on plain film. There are scattered degenerative changes of the interphalangeal joints. No joint space erosion or periosteal reaction. Alignment is within normal limits. Bone mineralization is within normal limits. No soft tissue calcification. Bilateral Foot 6/28/2017: RIGHT: No fracture or dislocation on plain film. No joint space narrowing. No joint space erosion or periosteal reaction. Alignment is within normal limits. Bone mineralization is within normal limits. There is minimal calcification at the Achilles insertion. LEFT: No fracture or dislocation on plain film. There are mild degenerative changes first MTP joint and scattered mild degenerative changes of the midfoot. No joint space erosion or periosteal reaction. Alignment is within normal limits. Bone mineralization is within normal limits. No soft tissue  calcification.     CT Imaging    None    MR Imaging    None    DXA None    ASSESSMENT AND PLAN    This is a follow-up visit for Ms. Vidya Rodas. 1) Seropositive Rheumatoid Arthritis. She is maintained on leflunomide 20 mg monotherapy with good tolerance. Her CDAI was previously 4.75 (previously 6, 2.5, 16.5, 19, 12, 11, 1) with 2 tender and 2 swollen joints, consistent with low disease activity. She feels well. I will continue treatment. Refill sent. Follow up in 6 months. 2) Long Term Use of Immunosuppressants. The patient remains on immunomodulatory medications (leflunomide) and requires frequent toxicity monitoring by blood work. Respective labs ordered. 3) Sjogren's Syndrome. (lacrimal and parotid gland hypertrophy, xerostomia, monoclonal gammopathy). This could be contributing to burning mouth. I tried her on pilocarpine which helped. She no longer has oral sores or burning tongue. I refilled Salagen today. 4) Bilateral Knee Osteoarthritis. This was not an active issue today. 5) Bilateral Hand Osteoarthritis. This was not an active issue today. 6) MGUS. Her M-spike was 0.2. This is due to #1, 2. She follows with Dr. Mary Bradshaw, hematology. 7) Left 2nd Stenosing Tenosynovitis. This is likely from #1. This was not an active issue today. 8) Vitamin D Deficiency. Her vitamin D level was 48.0 (previously 79.3, 29.7). She is maintained on every 14 days ergocalciferol 50,000. I refilled it    9) Right Cervical Radiculopathy. She has done physical therapy. This was not an active issue today. The patient voiced understanding of the aforementioned assessment and plan.      The patient has consented for synchronous (real-time) Telemedicine (audio technology) on 5/4/2020 for their care to be delivered over telemedicine in place of their regularly scheduled office visit pursuant to the emergency declaration under the [address] [place] [address], [address] [place] [address] authority and the [employer] Act, this Virtual  Visit was conducted, with patient's consent, to reduce the patient's risk of exposure to COVID-19 and provide continuity of care for an established patient. Today, I personally spent 12 minutes in direct patient care with the patient, of which greater than 50% of the time was spent in patient education, counseling, and coordination of care as described above. All questions asked and answered. Services were provided through an audio synchronous discussion virtually to substitute for in-person clinic visit. TODAY'S ORDERS    Orders Placed This Encounter    CBC WITH AUTOMATED DIFF    METABOLIC PANEL, COMPREHENSIVE    C REACTIVE PROTEIN, QT    SED RATE (ESR)    VITAMIN D, 25 HYDROXY    SPEP AND CHASE, SERUM    leflunomide (ARAVA) 20 mg tablet    ergocalciferol (ERGOCALCIFEROL) 1,250 mcg (50,000 unit) capsule    pilocarpine (SALAGEN) 5 mg tablet     No future appointments.   Christine Freed MD, [address] [facility]    Adult Rheumatology   Rheumatology Ultrasound Certified  [id number]  [facility], [address] [place] [address]   Phone [phone]  Fax [phone]

## 2020-11-04 ENCOUNTER — VIRTUAL VISIT (OUTPATIENT)
Dept: RHEUMATOLOGY | Age: 70
End: 2020-11-04
Payer: MEDICARE

## 2020-11-04 DIAGNOSIS — Z79.60 LONG-TERM USE OF IMMUNOSUPPRESSANT MEDICATION: ICD-10-CM

## 2020-11-04 DIAGNOSIS — E55.9 VITAMIN D DEFICIENCY: ICD-10-CM

## 2020-11-04 DIAGNOSIS — M05.79 SEROPOSITIVE RHEUMATOID ARTHRITIS OF MULTIPLE SITES (HCC): Primary | ICD-10-CM

## 2020-11-04 DIAGNOSIS — D47.2 MGUS (MONOCLONAL GAMMOPATHY OF UNKNOWN SIGNIFICANCE): ICD-10-CM

## 2020-11-04 DIAGNOSIS — M35.00 SECONDARY SJOGREN'S SYNDROME (HCC): ICD-10-CM

## 2020-11-04 PROCEDURE — 3017F COLORECTAL CA SCREEN DOC REV: CPT | Performed by: INTERNAL MEDICINE

## 2020-11-04 PROCEDURE — 1101F PT FALLS ASSESS-DOCD LE1/YR: CPT | Performed by: INTERNAL MEDICINE

## 2020-11-04 PROCEDURE — 1090F PRES/ABSN URINE INCON ASSESS: CPT | Performed by: INTERNAL MEDICINE

## 2020-11-04 PROCEDURE — G8432 DEP SCR NOT DOC, RNG: HCPCS | Performed by: INTERNAL MEDICINE

## 2020-11-04 PROCEDURE — G8427 DOCREV CUR MEDS BY ELIG CLIN: HCPCS | Performed by: INTERNAL MEDICINE

## 2020-11-04 PROCEDURE — 99215 OFFICE O/P EST HI 40 MIN: CPT | Performed by: INTERNAL MEDICINE

## 2020-11-04 PROCEDURE — G8400 PT W/DXA NO RESULTS DOC: HCPCS | Performed by: INTERNAL MEDICINE

## 2020-11-04 RX ORDER — ERGOCALCIFEROL 1.25 MG/1
50000 CAPSULE ORAL
Qty: 6 CAP | Refills: 3 | Status: SHIPPED | OUTPATIENT
Start: 2020-11-04

## 2020-11-04 RX ORDER — LEFLUNOMIDE 20 MG/1
TABLET ORAL
Qty: 90 TAB | Refills: 0 | Status: SHIPPED | OUTPATIENT
Start: 2020-11-04 | End: 2021-02-02 | Stop reason: SDUPTHER

## 2020-11-04 NOTE — PROGRESS NOTES
REASON FOR VISIT    This is a follow-up visit for Ms. Zully Padilla for    ICD-10-CM   1. Seropositive rheumatoid arthritis of multiple sites ([facility] [address] M05.79   2. Secondary Sjogren's syndrome ([facility] [address] M35.00   3. MGUS (monoclonal gammopathy of unknown significance) D47.2      The patient has consented for synchronous (real-time) Telemedicine (audio-video technology) on 11/4/2020 for their care to be delivered over telemedicine in place of their regularly scheduled office visit pursuant to the emergency declaration under the [address] [place] [address] waiver authority and the [employer] and [employer] Act, this Virtual  Visit was conducted, with patient's consent, to reduce the patient's risk of exposure to COVID-19 and provide continuity of care for an established patient. Services were provided through a video synchronous discussion virtually to substitute for in-person clinic visit. Inflammatory arthritis phenotype includes:  Anti-CCP positive: yes (>250)  Rheumatoid factor positive: yes (53.8)  Erosive disease: no  Extra-articular manifestations include: Sjogren's Syndrome, MGUS    Immunosuppression Screening (10/15/2018): Quantiferon TB: negative  PPD:  Not performed  Hepatitis B: negative  Hepatitis C: negative    Therapy History includes:  Current DMARD therapy includes: leflunomide 20 mg daily (7/26/2018 to 11/2018; 2/2019 to present)  Prior DMARD therapy includes: hydroxychloroquine 400 daily, methotrexate 20 mg every Tuesday  The following DMARDs have been ineffective: none  The following DMARDs were stopped because of side effects: methotrexate (nausea)    HISTORY OF PRESENT ILLNESS    Ms. Zully Padilla returns for a follow-up. On her last visit 4/18/2019, I continued monotherapy leflunomide 20 mg daily, which she has taken with good tolerance. I ordered labs which were not done. Today, she feels good.  She has morning stiffness in her hands lasting minutes to 30 minutes that resolves with use. Otherwise, she denies pain, swelling, or stiffness in her joints. She is working full time teaching high school. She denies fever, weight loss, blurred vision, vision loss, oral ulcers, ankle swelling, dry cough, dyspnea, nausea, vomiting, dysphagia, abdominal pain, black or bloody stool, fall since last visit, rash, easy bruising and increased thirst.    Last toxicity monitoring by blood work was done on 10/28/2019 and did not reveal any significant adverse effects, except WBC 3.1    Most recent inflammatory markers from 10/28/2019 revealed a ESR 22 mm/hr and CRP 1.0 mg/L. The patient has not had any interval hospital admissions, infections, or surgeries. REVIEW OF SYSTEMS    A comprehensive review of systems was performed and pertinent results are documented in the HPI, review of systems is otherwise non-contributory. PAST MEDICAL HISTORY    She has a past medical history of Rheumatoid arthritis ([address] FAMILY HISTORY    Her family history includes Arthritis-rheumatoid in her paternal grandmother; Diabetes in her mother; Heart Attack in her father; Parkinson's Disease in her maternal grandmother. SOCIAL HISTORY    She reports that she has quit smoking. She has never used smokeless tobacco. She reports current alcohol use. She reports that she does not use drugs. MEDICATIONS    Current Outpatient Medications   Medication Sig Dispense Refill    leflunomide (ARAVA) 20 mg tablet Take 1 tablet by mouth once daily 90 Tab 0    ergocalciferol (ERGOCALCIFEROL) 1,250 mcg (50,000 unit) capsule Take 1 Cap by mouth every fourteen (14) days. 6 Cap 3    pilocarpine (SALAGEN) 5 mg tablet Take 1 Tab by mouth three (3) times daily. 90 Tab 3    zinc sulfate (ZINC-15 PO) Take  by mouth.  magnesium 250 mg tab Take  by mouth daily.  vitamin E (AQUA GEMS) 400 unit capsule Take  by mouth as needed.           ALLERGIES    Allergies   Allergen Reactions    Nickel Rash       PHYSICAL EXAMINATION    There were no vitals taken for this visit. There is no height or weight on file to calculate BMI. General: Patient is alert, oriented x 3, not in acute distress    Chest:  Breathing comfortably at room air    Musculoskeletal exam:  A comprehensive musculoskeletal exam was NOT performed for all joints of each upper and lower extremity and assessed for swelling, tenderness and range of motion. Positive results are documented as below:    Previous Exam    Bilateral Gabe and Heberden nodes. Bilateral knee crepitus without effusion.   Left 2nd A1 pulley crepitus without tenderness    Joint Count 11/4/2019 4/18/2019 10/15/2018 7/9/2018 4/4/2018 1/10/2018 9/28/2017   Patient pain (0-100) 2.5 5 0 5 0.5 10 20   MHAQ 0 0 0 0 0 0 0   Left wrist- Tender - - - - - 1 -   Left wrist- Swollen - - - - 1 1 -   Left 2nd MCP - Swollen - - - 1 - - -   Left 3rd MCP - Swollen - - - - - 1 1   Left 4th MCP - Swollen - - - - 1 - -   Left 5th MCP - Tender - - - - 1 - -   Left 5th MCP - Swollen - - - 1 1 - 1   Left 2nd PIP - Tender - 0 0 1 1 1 1   Left 2nd PIP - Swollen - 1 1 1 1 1 1   Left 3rd PIP - Tender - 0 - 1 1 - -   Left 3rd PIP - Swollen - 1 - 1 1 - -   Left 4th PIP - Swollen - - - 1 - - -   Right 2nd MCP - Swollen - - - - - - 1   Right 3rd MCP - Swollen - - - - - 1 1   Right 2nd PIP - Tender - - - - 1 - -   Right 2nd PIP - Swollen - - - 1 1 - -   Right 3rd PIP - Tender 1 0 0 1 1 1 -   Right 3rd PIP - Swollen 1 1 1 1 1 1 1   Right 4th PIP - Tender - - - - 1 - -   Right 4th PIP - Swollen - - - 1 1 - -   Right 5th PIP - Tender 1 - - 1 1 - -   Right 5th PIP - Swollen 1 - - 1 1 - -   Right knee - Tender - - - - 1 - -   Tender Joint Count (Total) 2 0 0 4 8 3 1   Swollen Joint Count (Total) 2 3 2 9 9 5 6   Physician Assessment (0-10) 0.5 1 0.5 3 2 3 2   Patient Assessment (0-10) 0.25 2 0 0.5 0 1 2   CDAI Total (calculated) 4.75 6 2.5 16.5 19 12 11       DATA REVIEW    Laboratory Recent laboratory results were reviewed, summarized, and discussed with the patient. Imaging    Musculoskeletal Ultrasound    None    Radiographs    Bilateral Hand 6/28/2017: RIGHT: No fracture or dislocation on plain film. There are scattered degenerative changes of the interphalangeal joints. No joint space erosion or periosteal reaction. Alignment is within normal limits. Bone mineralization is within normal limits. No soft tissue calcification. LEFT: No fracture or dislocation on plain film. There are scattered degenerative changes of the interphalangeal joints. No joint space erosion or periosteal reaction. Alignment is within normal limits. Bone mineralization is within normal limits. No soft tissue calcification. Bilateral Foot 6/28/2017: RIGHT: No fracture or dislocation on plain film. No joint space narrowing. No joint space erosion or periosteal reaction. Alignment is within normal limits. Bone mineralization is within normal limits. There is minimal calcification at the Achilles insertion. LEFT: No fracture or dislocation on plain film. There are mild degenerative changes first MTP joint and scattered mild degenerative changes of the midfoot. No joint space erosion or periosteal reaction. Alignment is within normal limits. Bone mineralization is within normal limits. No soft tissue  calcification. CT Imaging    None    MR Imaging    None    DXA     None    ASSESSMENT AND PLAN    This is a follow-up visit for Ms. Seferino Greene. 1) Seropositive Rheumatoid Arthritis. She is maintained on leflunomide 20 mg monotherapy with good tolerance. She has morning stiffness lasting about 30 minutes which may be osteoarthritis. She feels well. Her CDAI was previously 4.75 (previously 6, 2.5, 16.5, 19, 12, 11, 1) with 2 tender and 2 swollen joints, consistent with low disease activity. I will continue treatment. Refill sent.      I asked her to have her labs drawn at [facility], [facility] or [facility] since she did not do them. I will mail a new lab slip. Follow up in 3 months. 2) Long Term Use of Immunosuppressants. The patient remains on immunomodulatory medications (leflunomide) and requires frequent toxicity monitoring by blood work. Respective labs ordered and mailed. 3) Sjogren's Syndrome. (lacrimal and parotid gland hypertrophy, xerostomia, monoclonal gammopathy). This could be contributing to burning mouth. I tried her on pilocarpine which helped. She no longer has oral sores or burning tongue. I will check serologies. 4) Bilateral Knee Osteoarthritis. This was not an active issue today. 5) Bilateral Hand Osteoarthritis. This was not an active issue today. 6) MGUS. Her M-spike was 0.2. This is due to #1, 2. She follows with Dr. Galindo Acosta, hematology. I will repeat. 7) Left 2nd Stenosing Tenosynovitis. This is likely from #1. This was not an active issue today. 8) Vitamin D Deficiency. Her vitamin D level was 48.0 (previously 79.3, 29.7). She is maintained on every 14 days ergocalciferol 50,000. I refilled it. I will check her level. 9) Right Cervical Radiculopathy. She has done physical therapy. This was not an active issue today. The patient voiced understanding of the aforementioned assessment and plan. The patient has consented for synchronous (real-time) Telemedicine (audio-video technology) on 11/4/2020 for their care to be delivered over telemedicine in place of their regularly scheduled office visit pursuant to the emergency declaration under the [facility], [facility] waiver authority and the [employer] Act, this Virtual  Visit was conducted, with patient's consent, to reduce the patient's risk of exposure to COVID-19 and provide continuity of care for an established patient.      Services were provided through a video synchronous discussion virtually to substitute for in-person clinic visit.    Wilianbehzad Srinivasan    Orders Placed This Encounter    C REACTIVE PROTEIN, QT    CBC WITH AUTOMATED DIFF    METABOLIC PANEL, COMPREHENSIVE    SED RATE (ESR)    PROTEIN ELECTROPHORESIS W/ REFLX CHASE    VITAMIN D, 25 HYDROXY    COMPLEMENT, C3 & C4    SJOGREN'S ABS, SSA AND SSB    CRYOGLOBULIN W/ REFLX CHASE    ANTINUCLEAR ANTIBODIES, IFA    leflunomide (ARAVA) 20 mg tablet    ergocalciferol (ERGOCALCIFEROL) 1,250 mcg (50,000 unit) capsule     No future appointments.   Fani Boggs MD, [address] [facility]    Adult Rheumatology   Rheumatology Ultrasound Certified  [address] [facility] [address]  [facility], [address] [place] [address]   Phone [phone]  Fax [phone]

## 2021-01-20 ENCOUNTER — TELEPHONE (OUTPATIENT)
Dept: RHEUMATOLOGY | Age: 71
End: 2021-01-20

## 2021-01-20 DIAGNOSIS — M54.2 NECK PAIN: Primary | ICD-10-CM

## 2021-01-20 NOTE — TELEPHONE ENCOUNTER
Spoke with pt who stated that she is having pain in her neck that is going down to her arm and fingers. She stated that she has done PT for it in the past, and when she was not working on the computer she was doing better and it didn't bother her. She is now back to working on the computer and she is in pain. She has been dealing with this for a few weeks now and wants to know what she can do? Please advise.

## 2021-02-03 ENCOUNTER — VIRTUAL VISIT (OUTPATIENT)
Dept: RHEUMATOLOGY | Age: 71
End: 2021-02-03
Payer: MEDICARE

## 2021-02-03 DIAGNOSIS — Z79.60 LONG-TERM USE OF IMMUNOSUPPRESSANT MEDICATION: ICD-10-CM

## 2021-02-03 DIAGNOSIS — M05.79 SEROPOSITIVE RHEUMATOID ARTHRITIS OF MULTIPLE SITES (HCC): ICD-10-CM

## 2021-02-03 DIAGNOSIS — E55.9 VITAMIN D DEFICIENCY: ICD-10-CM

## 2021-02-03 DIAGNOSIS — D47.2 MGUS (MONOCLONAL GAMMOPATHY OF UNKNOWN SIGNIFICANCE): ICD-10-CM

## 2021-02-03 DIAGNOSIS — M35.00 SECONDARY SJOGREN'S SYNDROME (HCC): Primary | ICD-10-CM

## 2021-02-03 PROCEDURE — 99215 OFFICE O/P EST HI 40 MIN: CPT | Performed by: INTERNAL MEDICINE

## 2021-02-03 PROCEDURE — G8432 DEP SCR NOT DOC, RNG: HCPCS | Performed by: INTERNAL MEDICINE

## 2021-02-03 PROCEDURE — G8400 PT W/DXA NO RESULTS DOC: HCPCS | Performed by: INTERNAL MEDICINE

## 2021-02-03 PROCEDURE — 1101F PT FALLS ASSESS-DOCD LE1/YR: CPT | Performed by: INTERNAL MEDICINE

## 2021-02-03 PROCEDURE — 3017F COLORECTAL CA SCREEN DOC REV: CPT | Performed by: INTERNAL MEDICINE

## 2021-02-03 PROCEDURE — 1090F PRES/ABSN URINE INCON ASSESS: CPT | Performed by: INTERNAL MEDICINE

## 2021-02-03 PROCEDURE — G8428 CUR MEDS NOT DOCUMENT: HCPCS | Performed by: INTERNAL MEDICINE

## 2021-02-03 RX ORDER — LEFLUNOMIDE 20 MG/1
TABLET ORAL
Qty: 90 TAB | Refills: 1 | Status: SHIPPED | OUTPATIENT
Start: 2021-02-03

## 2021-02-03 NOTE — PROGRESS NOTES
REASON FOR VISIT    This is a follow-up visit for Ms. Greg Aguilar for    ICD-10-CM   1. Seropositive rheumatoid arthritis of multiple sites ([facility] [address] M05.79   2. Secondary Sjogren's syndrome ([facility] [address] M35.00   3. MGUS (monoclonal gammopathy of unknown significance) D47.2      The patient has consented for synchronous (real-time) Telemedicine (audio-video technology) on 2/3/2021 for their care to be delivered over telemedicine in place of their regularly scheduled office visit pursuant to the emergency declaration under the [employer] and the [place] [address] waiver authority and the [employer] Act, this Virtual  Visit was conducted, with patient's consent, to reduce the patient's risk of exposure to COVID-19 and provide continuity of care for an established patient. Services were provided through a video synchronous discussion virtually to substitute for in-person clinic visit. Inflammatory arthritis phenotype includes:  Anti-CCP positive: yes (>250)  Rheumatoid factor positive: yes (53.8)  Erosive disease: no  Extra-articular manifestations include: Secondary Sjogren's Syndrome, MGUS    Immunosuppression Screening (10/15/2018): Quantiferon TB: negative  PPD:  Not performed  Hepatitis B: negative  Hepatitis C: negative    Therapy History includes:  Current DMARD therapy includes: leflunomide 20 mg daily (7/26/2018 to 11/2018; 2/2019 to present)  Prior DMARD therapy includes: hydroxychloroquine 400 daily, methotrexate 20 mg every Tuesday  The following DMARDs have been ineffective: none  The following DMARDs were stopped because of side effects: methotrexate (nausea)    HISTORY OF PRESENT ILLNESS    Ms. Greg Aguilar returns for a follow-up. On her last visit, I continued monotherapy leflunomide 20 mg daily, which she has taken with good tolerance. I ordered labs which were done at [facility], [facility] (records reviewed), which I reviewed with her.  Labs on 12/07/2020, showed WBC 2.22, ANC 0.62, ALT 41, positive RAYMUNDO 1:80 (homogenous), negative anti-Ro, anti-La, cryoglobulin. Today, she feels okay. She strained in her right neck muscle. She feels like it is a pinched nerve and there is some aching. She has resumed physical therapy. I reviewed her cervical radiograph spine from 3/01/2019 done at [facility], [facility] which showed right neuroforaminal narrowing. She continues to have morning stiffness in her hands lasting less minutes to 30 minutes that resolves with use without pain or swelling. Otherwise, she denies pain, swelling, or stiffness in her joints. She is working full time teaching high school. She denies fever, weight loss, blurred vision, vision loss, oral ulcers, ankle swelling, dry cough, dyspnea, nausea, vomiting, dysphagia, abdominal pain, black or bloody stool, fall since last visit, rash, easy bruising and increased thirst.    Last toxicity monitoring by blood work was done on 12/07/2020 and did not reveal any significant adverse effects, except WBC 2.22, ANC 0.62, ALT 41    Most recent inflammatory markers from 12/07/2020 revealed a ESR 7 mm/hr and CRP 1.0 mg/L. The patient has not had any interval hospital admissions, infections, or surgeries. REVIEW OF SYSTEMS    A comprehensive review of systems was performed and pertinent results are documented in the HPI, review of systems is otherwise non-contributory. PAST MEDICAL HISTORY    She has a past medical history of Rheumatoid arthritis ([facility] [place] [address] FAMILY HISTORY    Her family history includes Arthritis-rheumatoid in her paternal grandmother; Diabetes in her mother; Heart Attack in her father; Parkinson's Disease in her maternal grandmother. SOCIAL HISTORY    She reports that she has quit smoking. She has never used smokeless tobacco. She reports current alcohol use. She reports that she does not use drugs.     MEDICATIONS    Current Outpatient Medications   Medication Sig    leflunomide (ARAVA) 20 mg tablet Take 1 tablet by mouth once daily    ergocalciferol (ERGOCALCIFEROL) 1,250 mcg (50,000 unit) capsule Take 1 Cap by mouth every fourteen (14) days.  pilocarpine (SALAGEN) 5 mg tablet Take 1 Tab by mouth three (3) times daily.  zinc sulfate (ZINC-15 PO) Take  by mouth.  magnesium 250 mg tab Take  by mouth daily.  vitamin E (AQUA GEMS) 400 unit capsule Take  by mouth as needed. No current facility-administered medications for this visit. ALLERGIES    Allergies   Allergen Reactions    Nickel Rash       PHYSICAL EXAMINATION    There were no vitals taken for this visit. There is no height or weight on file to calculate BMI. General: Patient is alert, oriented x 3, not in acute distress    Chest:  Breathing comfortably at room air    Musculoskeletal exam:  A comprehensive musculoskeletal exam was NOT performed for all joints of each upper and lower extremity and assessed for swelling, tenderness and range of motion. Positive results are documented as below:    Previous Exam    Bilateral Gabe and Heberden nodes. Bilateral knee crepitus without effusion.   Left 2nd A1 pulley crepitus without tenderness    Joint Count 11/4/2019 4/18/2019 10/15/2018 7/9/2018 4/4/2018 1/10/2018 9/28/2017   Patient pain (0-100) 2.5 5 0 5 0.5 10 20   MHAQ 0 0 0 0 0 0 0   Left wrist- Tender - - - - - 1 -   Left wrist- Swollen - - - - 1 1 -   Left 2nd MCP - Swollen - - - 1 - - -   Left 3rd MCP - Swollen - - - - - 1 1   Left 4th MCP - Swollen - - - - 1 - -   Left 5th MCP - Tender - - - - 1 - -   Left 5th MCP - Swollen - - - 1 1 - 1   Left 2nd PIP - Tender - 0 0 1 1 1 1   Left 2nd PIP - Swollen - 1 1 1 1 1 1   Left 3rd PIP - Tender - 0 - 1 1 - -   Left 3rd PIP - Swollen - 1 - 1 1 - -   Left 4th PIP - Swollen - - - 1 - - -   Right 2nd MCP - Swollen - - - - - - 1   Right 3rd MCP - Swollen - - - - - 1 1   Right 2nd PIP - Tender - - - - 1 - -   Right 2nd PIP - Swollen - - - 1 1 - -   Right 3rd PIP - Tender 1 0 0 1 1 1 -   Right 3rd PIP - Swollen 1 1 1 1 1 1 1   Right 4th PIP - Tender - - - - 1 - -   Right 4th PIP - Swollen - - - 1 1 - -   Right 5th PIP - Tender 1 - - 1 1 - -   Right 5th PIP - Swollen 1 - - 1 1 - -   Right knee - Tender - - - - 1 - -   Tender Joint Count (Total) 2 0 0 4 8 3 1   Swollen Joint Count (Total) 2 3 2 9 9 5 6   Physician Assessment (0-10) 0.5 1 0.5 3 2 3 2   Patient Assessment (0-10) 0.25 2 0 0.5 0 1 2   CDAI Total (calculated) 4.75 6 2.5 16.5 19 12 11       DATA REVIEW    Laboratory     Recent laboratory results were reviewed, summarized, and discussed with the patient. Imaging    Musculoskeletal Ultrasound    None    Radiographs    Cervical Spine 3/01/2019: There is a slight upper cervical kyphosis. There are minimal disc degeneration changes C5-6. No significant apophyseal joint arthritic change identified. There is moderate bony narrowing of the right C4-5 foramen, slight C5-6. No significant left foraminal narrowing noted. Atlantooccipital, C1-2 relationships appear within normal limits. No fracture, listhesis, or aggressive process. No cervical ribs. Bilateral Hand 6/28/2017: RIGHT: No fracture or dislocation on plain film. There are scattered degenerative changes of the interphalangeal joints. No joint space erosion or periosteal reaction. Alignment is within normal limits. Bone mineralization is within normal limits. No soft tissue calcification. LEFT: No fracture or dislocation on plain film. There are scattered degenerative changes of the interphalangeal joints. No joint space erosion or periosteal reaction. Alignment is within normal limits. Bone mineralization is within normal limits. No soft tissue calcification. Bilateral Foot 6/28/2017: RIGHT: No fracture or dislocation on plain film. No joint space narrowing. No joint space erosion or periosteal reaction. Alignment is within normal limits. Bone mineralization is within normal limits.  There is minimal calcification at the Achilles insertion. LEFT: No fracture or dislocation on plain film. There are mild degenerative changes first MTP joint and scattered mild degenerative changes of the midfoot. No joint space erosion or periosteal reaction. Alignment is within normal limits. Bone mineralization is within normal limits. No soft tissue calcification. Bilateral Hand 4/02/2015: RIGHT: Bony cortex and trabecular patterns are normal.  Nonspecific periarticular calcific deposition identified along the first interphalangeal joint and third proximal interphalangeal joint, which may represent capsular calcification. Mild fourth DIP, mild second through fourth PIP joint space narrowing with slight osteophyte formation identified. No erosive changes. No fracture, subluxation or destructive lesion. Joint spaces otherwise appear well maintained. Radiographically, no significant soft tissue swelling is seen. LEFT: Bony cortex and trabecular patterns are normal. No fracture, subluxation or destructive lesion. No erosive changes. Mild diffuse PIP and PIP joint space narrowing identified with minimal osteophyte formation at the third and fourth DIP joints. Joint spaces otherwise appear well maintained. Mild soft tissue swelling is evident regional to the second PIP joint. CT Imaging    None    MR Imaging    None    DXA     DXA 8/22/2018: within normal limits [facility])    [facility] [address]    This is a follow-up visit for Ms. Greg Aguilar. 1) Seropositive Rheumatoid Arthritis. She is maintained on leflunomide 20 mg daily with good tolerance. She has morning stiffness lasting less than 30 minutes which may be osteoarthritis. She feels well. Her CDAI was previously 4.75 (previously 6, 2.5, 16.5, 19, 12, 11, 1) with 2 tender and 2 swollen joints, consistent with low disease activity. I will continue treatment. Refill sent. 2) Long Term Use of Immunosuppressants.  The patient remains on immunomodulatory medications (leflunomide) and requires frequent toxicity monitoring by blood work. 3) Secondary Sjogren's Syndrome. (1:80 (homogeneous), leukopenia, lacrimal and parotid gland hypertrophy, xerostomia, monoclonal gammopathy). This could be contributing to burning mouth. I had tried her on pilocarpine which helped. She no longer has oral sores or burning tongue. 4) Bilateral Knee Osteoarthritis. This was not an active issue today. 5) Bilateral Hand Osteoarthritis. This was not an active issue today. 6) MGUS. Her M-spike was 0.2. This is due to #1, 2. She follows with Dr. Jerline Peabody, hematology. 7) Left 2nd Stenosing Tenosynovitis. This was likely from #1. This was not an active issue today. 8) Vitamin D Deficiency. Her vitamin D level was 33.2 (previously 48.0, 79.3, 29.7). She is maintained on every 14 days ergocalciferol 50,000. 9) Right Cervical Radiculopathy. She has done physical therapy. This was an active issue today and she has resumed doing her PT at home. I asked her to see orthopedic if it does not improve. 10) Elevated LFT. Her ALT was 41. I will monitor. The patient voiced understanding of the aforementioned assessment and plan. The patient has consented for synchronous (real-time) Telemedicine (audio-video technology) on 2/3/2021 for their care to be delivered over telemedicine in place of their regularly scheduled office visit pursuant to the emergency declaration under the [facility], Count includes the [facility] waiver authority and the [employer] Act, this Virtual  Visit was conducted, with patient's consent, to reduce the patient's risk of exposure to COVID-19 and provide continuity of care for an established patient. Services were provided through a video synchronous discussion virtually to substitute for in-person clinic visit.     TODAY'S ORDERS    Orders Placed This Encounter    leflunomide (ARAVA) 20 mg tablet     No future appointments.   Janet Zamora MD, [facility] [facility]    Adult Rheumatology   Rheumatology Ultrasound Certified  [address]  [place], [address]   Phone [phone]  Fax [phone]

## 2021-02-22 ENCOUNTER — TELEPHONE (OUTPATIENT)
Dept: RHEUMATOLOGY | Age: 71
End: 2021-02-22

## 2021-06-21 ENCOUNTER — TELEPHONE (OUTPATIENT)
Dept: RHEUMATOLOGY | Age: 71
End: 2021-06-21

## 2021-06-21 NOTE — TELEPHONE ENCOUNTER
Left message for pt stating that we are sending her in some triamcinolone/kenalog dental paste for her to apply to the sores twice daily for 7 days. If no relief she was instructed to follow up with her PCP or dentist for further treatment.

## 2021-06-21 NOTE — TELEPHONE ENCOUNTER
----- Message from Bernie Bauer LPN sent at 6/90/2111  8:50 AM EDT -----  Regarding: FW: Dr. Ade Francis    ----- Message -----  From: Leeann Dominguez  Sent: 6/18/2021   4:12 PM EDT  To: [facility] Nurse Pool  Subject: Dr. Haroldo Gamez Message/Vendor Calls    Caller's first and last name: Pt      Reason for call: sores in mouth      Callback required yes/no and why: Yes      Best contact number(s): [phone]      Details to clarify the request: Pt is calling to notify Dr. Zac Sampson that she is getting sores on the inside of her jaw. She said she sometimes gets them on her tongue as well, but mostly inside her jawline.        Harford Senters

## 2021-06-21 NOTE — TELEPHONE ENCOUNTER
Pt called stating that she is having some sores in her mouth. She stated that they are inside her jawline and on her tongue. She wants to know what she needs to do for these. She has had them now for a few days and they are bothering her. They are not open or pussing, but it feels like when you bite the iside of your mouth and it is healing. Please advise.

## 2021-06-22 RX ORDER — TRIAMCINOLONE ACETONIDE 1 MG/G
PASTE DENTAL 2 TIMES DAILY
Qty: 1 TUBE | Refills: 1 | Status: SHIPPED | OUTPATIENT
Start: 2021-06-22

## 2021-07-13 ENCOUNTER — VIRTUAL VISIT (OUTPATIENT)
Dept: RHEUMATOLOGY | Age: 71
End: 2021-07-13
Payer: MEDICARE

## 2021-07-13 DIAGNOSIS — Z79.60 LONG-TERM USE OF IMMUNOSUPPRESSANT MEDICATION: ICD-10-CM

## 2021-07-13 DIAGNOSIS — E55.9 VITAMIN D DEFICIENCY: ICD-10-CM

## 2021-07-13 DIAGNOSIS — M05.79 SEROPOSITIVE RHEUMATOID ARTHRITIS OF MULTIPLE SITES (HCC): Primary | ICD-10-CM

## 2021-07-13 PROCEDURE — 1090F PRES/ABSN URINE INCON ASSESS: CPT | Performed by: INTERNAL MEDICINE

## 2021-07-13 PROCEDURE — 1101F PT FALLS ASSESS-DOCD LE1/YR: CPT | Performed by: INTERNAL MEDICINE

## 2021-07-13 PROCEDURE — G8400 PT W/DXA NO RESULTS DOC: HCPCS | Performed by: INTERNAL MEDICINE

## 2021-07-13 PROCEDURE — 99215 OFFICE O/P EST HI 40 MIN: CPT | Performed by: INTERNAL MEDICINE

## 2021-07-13 PROCEDURE — G8427 DOCREV CUR MEDS BY ELIG CLIN: HCPCS | Performed by: INTERNAL MEDICINE

## 2021-07-13 PROCEDURE — 3017F COLORECTAL CA SCREEN DOC REV: CPT | Performed by: INTERNAL MEDICINE

## 2021-07-13 PROCEDURE — G8432 DEP SCR NOT DOC, RNG: HCPCS | Performed by: INTERNAL MEDICINE

## 2021-07-13 NOTE — PROGRESS NOTES
REASON FOR VISIT    This is a follow-up visit for Ms. Corinne Canada for    ICD-10-CM   1. Seropositive rheumatoid arthritis of multiple sites ([facility] [address] M05.79   2. Secondary Sjogren's syndrome ([facility] [address] M35.00   3. MGUS (monoclonal gammopathy of unknown significance) D47.2      The patient has consented for synchronous (real-time) Telemedicine (audio-video technology) on 7/13/2021 for their care to be delivered over telemedicine in place of their regularly scheduled office visit pursuant to the emergency declaration under the [address] and the [address] authority and the [employer] Act, this Virtual  Visit was conducted, with patient's consent, to reduce the patient's risk of exposure to COVID-19 and provide continuity of care for an established patient. Services were provided through a video synchronous discussion virtually to substitute for in-person clinic visit. Inflammatory arthritis phenotype includes:  Anti-CCP positive: yes (>250)  Rheumatoid factor positive: yes (53.8)  Erosive disease: no  Extra-articular manifestations include: Secondary Sjogren's Syndrome, MGUS    Immunosuppression Screening (10/15/2018): Quantiferon TB: negative  PPD:  Not performed  Hepatitis B: negative  Hepatitis C: negative    Therapy History includes:  Current DMARD therapy includes: leflunomide 20 mg daily (7/26/2018 to 11/2018; 2/2019 to present)  Prior DMARD therapy includes: hydroxychloroquine 400 daily, methotrexate 20 mg every Tuesday  The following DMARDs have been ineffective: none  The following DMARDs were stopped because of side effects: methotrexate (nausea)    HISTORY OF PRESENT ILLNESS    Ms. Corinne Caanda returns for a follow-up. On her last visit, I continued monotherapy leflunomide 20 mg daily, which she has taken with good tolerance. Today, she feels okay.  She has morning stiffness in her hands lasting several minutes that resolves with stretches without pain or swelling. She has sores on the inside of her bilateral inner cheek that feels she had been biting. She wear a guard but the sores are on the upper aspect of her maxilla. They heal with dark lesion. This is daily and not new. She is not sure if this is due to leflunomide. She denies fever, weight loss, blurred vision, vision loss, oral ulcers, ankle swelling, dry cough, dyspnea, nausea, vomiting, dysphagia, abdominal pain, black or bloody stool, fall since last visit, rash, easy bruising and increased thirst.    Last toxicity monitoring by blood work was done on 12/07/2020 and did not reveal any significant adverse effects, except WBC 2.22, ANC 0.62, ALT 41    Most recent inflammatory markers from 12/07/2020 revealed a ESR 7 mm/hr and CRP 1.0 mg/L. The patient has not had any interval hospital admissions, infections, or surgeries. REVIEW OF SYSTEMS    A comprehensive review of systems was performed and pertinent results are documented in the HPI, review of systems is otherwise non-contributory. PAST MEDICAL HISTORY    She has a past medical history of Rheumatoid arthritis ([facility] [address] FAMILY HISTORY    Her family history includes Arthritis-rheumatoid in her paternal grandmother; Diabetes in her mother; Heart Attack in her father; Parkinson's Disease in her maternal grandmother. SOCIAL HISTORY    She reports that she has quit smoking. She has never used smokeless tobacco. She reports current alcohol use. She reports that she does not use drugs. MEDICATIONS    Current Outpatient Medications   Medication Sig    triamcinolone acetonide (KENALOG) 0.1 % dental paste by Dental route two (2) times a day.  leflunomide (ARAVA) 20 mg tablet Take 1 tablet by mouth once daily    ergocalciferol (ERGOCALCIFEROL) 1,250 mcg (50,000 unit) capsule Take 1 Cap by mouth every fourteen (14) days.  pilocarpine (SALAGEN) 5 mg tablet Take 1 Tab by mouth three (3) times daily.     zinc sulfate (ZINC-15 PO) Take  by mouth.  magnesium 250 mg tab Take  by mouth daily.  vitamin E (AQUA GEMS) 400 unit capsule Take  by mouth as needed. No current facility-administered medications for this visit. ALLERGIES    Allergies   Allergen Reactions    Nickel Rash       PHYSICAL EXAMINATION    There were no vitals taken for this visit. There is no height or weight on file to calculate BMI. General: Patient is alert, oriented x 3, not in acute distress    Chest:  Breathing comfortably at room air    Musculoskeletal exam:  A comprehensive musculoskeletal exam was NOT performed for all joints of each upper and lower extremity and assessed for swelling, tenderness and range of motion. Positive results are documented as below:    Previous Exam    Bilateral Gabe and Heberden nodes. Bilateral knee crepitus without effusion.   Left 2nd A1 pulley crepitus without tenderness    Joint Count 11/4/2019 4/18/2019 10/15/2018 7/9/2018 4/4/2018 1/10/2018 9/28/2017   Patient pain (0-100) 2.5 5 0 5 0.5 10 20   MHAQ 0 0 0 0 0 0 0   Left wrist- Tender - - - - - 1 -   Left wrist- Swollen - - - - 1 1 -   Left 2nd MCP - Swollen - - - 1 - - -   Left 3rd MCP - Swollen - - - - - 1 1   Left 4th MCP - Swollen - - - - 1 - -   Left 5th MCP - Tender - - - - 1 - -   Left 5th MCP - Swollen - - - 1 1 - 1   Left 2nd PIP - Tender - 0 0 1 1 1 1   Left 2nd PIP - Swollen - 1 1 1 1 1 1   Left 3rd PIP - Tender - 0 - 1 1 - -   Left 3rd PIP - Swollen - 1 - 1 1 - -   Left 4th PIP - Swollen - - - 1 - - -   Right 2nd MCP - Swollen - - - - - - 1   Right 3rd MCP - Swollen - - - - - 1 1   Right 2nd PIP - Tender - - - - 1 - -   Right 2nd PIP - Swollen - - - 1 1 - -   Right 3rd PIP - Tender 1 0 0 1 1 1 -   Right 3rd PIP - Swollen 1 1 1 1 1 1 1   Right 4th PIP - Tender - - - - 1 - -   Right 4th PIP - Swollen - - - 1 1 - -   Right 5th PIP - Tender 1 - - 1 1 - -   Right 5th PIP - Swollen 1 - - 1 1 - -   Right knee - Tender - - - - 1 - -   Tender Joint Count (Total) 2 0 0 4 8 3 1   Swollen Joint Count (Total) 2 3 2 9 9 5 6   Physician Assessment (0-10) 0.5 1 0.5 3 2 3 2   Patient Assessment (0-10) 0.25 2 0 0.5 0 1 2   CDAI Total (calculated) 4.75 6 2.5 16.5 19 12 11       DATA REVIEW    Laboratory     Recent laboratory results were reviewed, summarized, and discussed with the patient. Imaging    Musculoskeletal Ultrasound    None    Radiographs    Cervical Spine 3/01/2019: There is a slight upper cervical kyphosis. There are minimal disc degeneration changes C5-6. No significant apophyseal joint arthritic change identified. There is moderate bony narrowing of the right C4-5 foramen, slight C5-6. No significant left foraminal narrowing noted. Atlantooccipital, C1-2 relationships appear within normal limits. No fracture, listhesis, or aggressive process. No cervical ribs. Bilateral Hand 6/28/2017: RIGHT: No fracture or dislocation on plain film. There are scattered degenerative changes of the interphalangeal joints. No joint space erosion or periosteal reaction. Alignment is within normal limits. Bone mineralization is within normal limits. No soft tissue calcification. LEFT: No fracture or dislocation on plain film. There are scattered degenerative changes of the interphalangeal joints. No joint space erosion or periosteal reaction. Alignment is within normal limits. Bone mineralization is within normal limits. No soft tissue calcification. Bilateral Foot 6/28/2017: RIGHT: No fracture or dislocation on plain film. No joint space narrowing. No joint space erosion or periosteal reaction. Alignment is within normal limits. Bone mineralization is within normal limits. There is minimal calcification at the Achilles insertion. LEFT: No fracture or dislocation on plain film. There are mild degenerative changes first MTP joint and scattered mild degenerative changes of the midfoot. No joint space erosion or periosteal reaction.  Alignment is within normal limits. Bone mineralization is within normal limits. No soft tissue calcification. Bilateral Hand 4/02/2015: RIGHT: Bony cortex and trabecular patterns are normal.  Nonspecific periarticular calcific deposition identified along the first interphalangeal joint and third proximal interphalangeal joint, which may represent capsular calcification. Mild fourth DIP, mild second through fourth PIP joint space narrowing with slight osteophyte formation identified. No erosive changes. No fracture, subluxation or destructive lesion. Joint spaces otherwise appear well maintained. Radiographically, no significant soft tissue swelling is seen. LEFT: Bony cortex and trabecular patterns are normal. No fracture, subluxation or destructive lesion. No erosive changes. Mild diffuse PIP and PIP joint space narrowing identified with minimal osteophyte formation at the third and fourth DIP joints. Joint spaces otherwise appear well maintained. Mild soft tissue swelling is evident regional to the second PIP joint. CT Imaging    None    MR Imaging    None    DXA     DXA 8/22/2018: within normal limits [facility])    [facility]    This is a follow-up visit for Ms. Marjorie Dickinson. 1) Seropositive Rheumatoid Arthritis. She is maintained on leflunomide 20 mg daily with good tolerance. She denies inflammatory joint symptoms. She is concerned about oral ulcers along her buccal mucosa that come and go and heal with hyperpigmentation since this is chronic and she thinks it may be related to leflunomide. Her CDAI was previously 4.75 (previously 6, 2.5, 16.5, 19, 12, 11, 1) with 2 tender and 2 swollen joints, consistent with low disease activity. I asked her to hold leflunomide for 4 weeks and let me know if these oral lesions resolve or if she flares. 2) Long Term Use of Immunosuppressants.  The patient remains on high risk immunomodulatory medications (leflunomide) and requires frequent toxicity monitoring by blood work to evaluate for toxicities. Respective labs were ordered (see below orders for details). 3) Secondary Sjogren's Syndrome. (1:80 (homogeneous), leukopenia, lacrimal and parotid gland hypertrophy, xerostomia, monoclonal gammopathy). This could be contributing to burning mouth. I had tried her on pilocarpine which helped. She no longer has oral sores or burning tongue. 4) Bilateral Knee Osteoarthritis. This was not an active issue today. 5) Bilateral Hand Osteoarthritis. This was not an active issue today. 6) MGUS. Her M-spike was 0.2. This is due to #1, 2. She follows with Dr. Dick Venegas, hematology. 7) Left 2nd Stenosing Tenosynovitis. This was likely from #1. This was not an active issue today. 8) Vitamin D Deficiency. Her vitamin D level was 33.2 (previously 48.0, 79.3, 29.7). She is maintained on every 14 days ergocalciferol 50,000. I will check her level. 9) Right Cervical Radiculopathy. She had done physical therapy. This was not an active issue today. I had asked her to see orthopedic if it does not improve. 10) Elevated LFT. Her ALT was 41. I will repeat. The patient voiced understanding of the aforementioned assessment and plan. The patient has consented for synchronous (real-time) Telemedicine (audio-video technology) on 7/13/2021 for their care to be delivered over telemedicine in place of their regularly scheduled office visit pursuant to the emergency declaration under the [facility], [facility] waiver authority and the [employer] and [employer] Act, this Virtual  Visit was conducted, with patient's consent, to reduce the patient's risk of exposure to COVID-19 and provide continuity of care for an established patient.     A total of 41 minutes was spent on this visit, reviewing interval notes, interval testing results, ordering tests, refilling medications, documenting the findings in the note, patient education, counseling, and coordination of care as described above. All questions asked and answered. Services were provided through a video synchronous discussion virtually to substitute for in-person clinic visit.     TODAY'S ORDERS    Orders Placed This Encounter    CBC WITH AUTOMATED DIFF    METABOLIC PANEL, COMPREHENSIVE    C REACTIVE PROTEIN, QT    SED RATE (ESR)    LEFLUNOMIDE METABOLITE    VITAMIN D, 25 HYDROXY    CHRONIC HEPATITIS PANEL    QUANTIFERON-TB PLUS(CLIENT INCUB.)     Future Appointments   Date Time Provider Alyssa Ha   10/4/2021  3:20 PM Basilio Griffin MD AOCR BS AMB     Crow Goodson MD, [address] [place] [address]    Adult Rheumatology   Rheumatology Ultrasound Certified  [facility]  A Part of [facility], [address] [place] [address]   Phone [phone]  Fax [phone]

## 2021-10-04 ENCOUNTER — VIRTUAL VISIT (OUTPATIENT)
Dept: RHEUMATOLOGY | Age: 71
End: 2021-10-04

## 2021-10-04 ENCOUNTER — TELEPHONE (OUTPATIENT)
Dept: RHEUMATOLOGY | Age: 71
End: 2021-10-04

## 2022-03-18 PROBLEM — M65.30 STENOSING TENOSYNOVITIS OF FINGER: Status: ACTIVE | Noted: 2017-09-28

## 2022-03-18 PROBLEM — M17.0 PRIMARY OSTEOARTHRITIS OF BOTH KNEES: Status: ACTIVE | Noted: 2017-06-28

## 2022-03-18 PROBLEM — D72.819 LEUKOPENIA: Status: ACTIVE | Noted: 2017-10-19

## 2022-03-19 PROBLEM — M35.00 SECONDARY SJOGREN'S SYNDROME (HCC): Status: ACTIVE | Noted: 2017-06-28

## 2022-03-19 PROBLEM — D47.2 MGUS (MONOCLONAL GAMMOPATHY OF UNKNOWN SIGNIFICANCE): Status: ACTIVE | Noted: 2017-09-28

## 2022-03-19 PROBLEM — K14.6 BURNING MOUTH SYNDROME: Status: ACTIVE | Noted: 2019-04-18

## 2022-03-19 PROBLEM — M05.79 SEROPOSITIVE RHEUMATOID ARTHRITIS OF MULTIPLE SITES (HCC): Status: ACTIVE | Noted: 2017-06-28

## 2022-03-20 PROBLEM — E55.9 VITAMIN D DEFICIENCY: Status: ACTIVE | Noted: 2017-09-28

## 2022-03-20 PROBLEM — M19.042 PRIMARY OSTEOARTHRITIS OF BOTH HANDS: Status: ACTIVE | Noted: 2017-06-28

## 2022-03-20 PROBLEM — Z79.60 LONG-TERM USE OF IMMUNOSUPPRESSANT MEDICATION: Status: ACTIVE | Noted: 2017-06-28

## 2022-03-20 PROBLEM — M19.041 PRIMARY OSTEOARTHRITIS OF BOTH HANDS: Status: ACTIVE | Noted: 2017-06-28

## 2023-05-18 RX ORDER — TRIAMCINOLONE ACETONIDE 0.1 %
PASTE (GRAM) DENTAL 2 TIMES DAILY
COMMUNITY
Start: 2021-06-22

## 2023-05-18 RX ORDER — PILOCARPINE HYDROCHLORIDE 5 MG/1
5 TABLET, FILM COATED ORAL 3 TIMES DAILY
COMMUNITY
Start: 2020-05-04

## 2023-05-18 RX ORDER — LEFLUNOMIDE 20 MG/1
1 TABLET ORAL DAILY
COMMUNITY
Start: 2021-02-03

## 2023-05-18 RX ORDER — ERGOCALCIFEROL 1.25 MG/1
50000 CAPSULE ORAL
COMMUNITY
Start: 2020-11-04

## 2023-10-12 ENCOUNTER — APPOINTMENT (OUTPATIENT)
Dept: URBAN - METROPOLITAN AREA CLINIC 277 | Age: 73
Setting detail: DERMATOLOGY
End: 2023-10-13

## 2023-10-12 DIAGNOSIS — L93.0 DISCOID LUPUS ERYTHEMATOSUS: ICD-10-CM

## 2023-10-12 DIAGNOSIS — L21.8 OTHER SEBORRHEIC DERMATITIS: ICD-10-CM

## 2023-10-12 PROBLEM — L30.9 DERMATITIS, UNSPECIFIED: Status: ACTIVE | Noted: 2023-10-12

## 2023-10-12 PROCEDURE — 11104 PUNCH BX SKIN SINGLE LESION: CPT

## 2023-10-12 PROCEDURE — OTHER MEDICATION COUNSELING: OTHER

## 2023-10-12 PROCEDURE — 99203 OFFICE O/P NEW LOW 30 MIN: CPT | Mod: 25

## 2023-10-12 PROCEDURE — OTHER BIOPSY BY PUNCH METHOD: OTHER

## 2023-10-12 PROCEDURE — OTHER COUNSELING: OTHER

## 2023-10-12 PROCEDURE — OTHER TREATMENT REGIMEN: OTHER

## 2023-10-12 PROCEDURE — OTHER MIPS QUALITY: OTHER

## 2023-10-12 PROCEDURE — OTHER PRESCRIPTION: OTHER

## 2023-10-12 RX ORDER — TRIAMCINOLONE ACETONIDE 1 MG/G
OINTMENT TOPICAL
Qty: 80 | Refills: 2 | Status: ERX | COMMUNITY
Start: 2023-10-12

## 2023-10-12 ASSESSMENT — LOCATION DETAILED DESCRIPTION DERM
LOCATION DETAILED: RIGHT CENTRAL EYEBROW
LOCATION DETAILED: RIGHT INFERIOR FOREHEAD

## 2023-10-12 ASSESSMENT — SEVERITY ASSESSMENT: SEVERITY: MILD

## 2023-10-12 ASSESSMENT — LOCATION SIMPLE DESCRIPTION DERM
LOCATION SIMPLE: RIGHT FOREHEAD
LOCATION SIMPLE: RIGHT EYEBROW

## 2023-10-12 ASSESSMENT — LOCATION ZONE DERM: LOCATION ZONE: FACE

## 2023-10-12 NOTE — PROCEDURE: TREATMENT REGIMEN
Initiate Treatment: Triamcinolone- Apply to the affected areas twice daily for up to two weeks then reduce to 2-3x weekly
Detail Level: Zone

## 2023-10-12 NOTE — PROCEDURE: MEDICATION COUNSELING
Complete Sotyktu Pregnancy And Lactation Text: There is insufficient data to evaluate whether or not Sotyktu is safe to use during pregnancy.   It is not known if Sotyktu passes into breast milk and whether or not it is safe to use when breastfeeding.

## 2023-10-18 ENCOUNTER — APPOINTMENT (OUTPATIENT)
Dept: URBAN - METROPOLITAN AREA CLINIC 277 | Age: 73
Setting detail: DERMATOLOGY
End: 2023-10-18

## 2023-10-18 DIAGNOSIS — Z48.02 ENCOUNTER FOR REMOVAL OF SUTURES: ICD-10-CM

## 2023-10-18 PROCEDURE — OTHER PHOTO-DOCUMENTATION: OTHER

## 2023-10-18 PROCEDURE — 99212 OFFICE O/P EST SF 10 MIN: CPT

## 2023-10-18 PROCEDURE — OTHER COUNSELING: OTHER

## 2023-10-18 PROCEDURE — OTHER MIPS QUALITY: OTHER

## 2023-10-18 PROCEDURE — OTHER SUTURE REMOVAL (NO GLOBAL PERIOD): OTHER

## 2023-10-18 ASSESSMENT — LOCATION ZONE DERM: LOCATION ZONE: FACE

## 2023-10-18 ASSESSMENT — LOCATION SIMPLE DESCRIPTION DERM: LOCATION SIMPLE: RIGHT EYEBROW

## 2023-10-18 ASSESSMENT — LOCATION DETAILED DESCRIPTION DERM: LOCATION DETAILED: RIGHT CENTRAL EYEBROW

## 2023-10-18 NOTE — PROCEDURE: PHOTO-DOCUMENTATION
Photo Preface (Leave Blank If You Do Not Want): Photographs were obtained today
Detail Level: Zone
Details (Free Text): Right eyebrow

## 2023-10-21 ENCOUNTER — APPOINTMENT (OUTPATIENT)
Dept: URBAN - METROPOLITAN AREA CLINIC 277 | Age: 73
Setting detail: DERMATOLOGY
End: 2023-10-23

## 2023-10-21 DIAGNOSIS — L93.0 DISCOID LUPUS ERYTHEMATOSUS: ICD-10-CM

## 2023-10-21 PROCEDURE — OTHER ORDER TESTS: OTHER

## 2023-10-21 NOTE — PROCEDURE: ORDER TESTS
Performing Laboratory: 0
Billing Type: Third-Party Bill
Expected Date Of Service: 10/21/2023
Bill For Surgical Tray: no